# Patient Record
Sex: MALE | Race: WHITE | HISPANIC OR LATINO | Employment: UNEMPLOYED | URBAN - METROPOLITAN AREA
[De-identification: names, ages, dates, MRNs, and addresses within clinical notes are randomized per-mention and may not be internally consistent; named-entity substitution may affect disease eponyms.]

---
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
## 2019-02-15 ENCOUNTER — HOSPITAL ENCOUNTER (EMERGENCY)
Facility: CLINIC | Age: 19
Discharge: HOME | End: 2019-02-15
Attending: NURSE PRACTITIONER | Admitting: NURSE PRACTITIONER
Payer: COMMERCIAL

## 2019-02-15 VITALS
RESPIRATION RATE: 16 BRPM | SYSTOLIC BLOOD PRESSURE: 107 MMHG | WEIGHT: 160 LBS | HEART RATE: 82 BPM | DIASTOLIC BLOOD PRESSURE: 59 MMHG | TEMPERATURE: 98 F | BODY MASS INDEX: 22.96 KG/M2 | OXYGEN SATURATION: 98 %

## 2019-02-15 DIAGNOSIS — T50.901A DRUG OVERDOSE: ICD-10-CM

## 2019-02-15 PROCEDURE — 99282 EMERGENCY DEPT VISIT SF MDM: CPT

## 2019-02-15 RX ORDER — DIPHENHYDRAMINE HCL 25 MG
50 CAPSULE ORAL ONCE
Status: DISCONTINUED | OUTPATIENT
Start: 2019-02-15 | End: 2019-02-15

## 2019-02-15 ASSESSMENT — ENCOUNTER SYMPTOMS
FATIGUE: 1
VOMITING: 1
SHORTNESS OF BREATH: 1

## 2019-02-15 NOTE — ED AVS SNAPSHOT
[facility]  [address]  [facility] [address]  Phone:  [phone]  Fax:  [phone]                                    Juan Carlos Edouard   MRN: [id number]    Department:  [facility] Emergency Department   Date of Visit:  2/15/2019           After Visit Summary Signature Page    I have received my discharge instructions, and my questions have been answered. I have discussed any challenges I see with this plan with the nurse or doctor.    ..........................................................................................................................................  Patient/Patient Representative Signature      ..........................................................................................................................................  Patient Representative Print Name and Relationship to Patient    ..................................................               ................................................  Date                                   Time    ..........................................................................................................................................  Reviewed by Signature/Title    ...................................................              ..............................................  Date                                               Time          22EPIC Rev 08/18

## 2019-02-15 NOTE — ED PROVIDER NOTES
History     Chief Complaint:  Medication Reaction      HPI   Juan Carlos Edouard is a 18 year old male who presents with his mother for evaluation of medication reaction. The patient reports that he asked an acquaintance for pain medication for right hand pain he has had for the past year. His acquaintance gave him a blue pill which had the impression M30 on it. He took half of the pill around 11:30 AM and vomited 20 minutes later. He subsequently began to feel itchy and continued to vomited. Patient has vomited a total of 3 times with last emesis around noon. Patient mentions that he felt like he had some difficulty breathing for a short period of time and fatigue after he took the medication but this has resolved. Patient also states that he smoked Marijuana at 9 AM. He denies any thoughts of self harm or depression.  He has not taken any further OTC medications today including ibuprofen, tylenol or aspirin.    Allergies:  Penicillins     Medications:    The patient is not currently taking any prescribed medications.    Past Medical History:    The patient does not have any past pertinent medical history.    Past Surgical History:    Tonsillectomy  Myringotomy    Family History:    History reviewed. No pertinent family history.     Social History:  Smoking status: Current every day smoker  Alcohol use: No  Marital Status:  Single [1]       Review of Systems   Constitutional: Positive for fatigue.   Respiratory: Positive for shortness of breath.    Gastrointestinal: Positive for vomiting.   All other systems reviewed and are negative.      Physical Exam     Patient Vitals for the past 24 hrs:   BP Temp Temp src Pulse Heart Rate Resp SpO2 Weight   02/15/19 1455 -- -- -- 82 -- 20 98 % --   02/15/19 1411 134/59 98  F (36.7  C) Temporal 62 62 16 98 % 72.6 kg (160 lb)         Physical Exam  Constitutional: Alert, attentive, no evidence of sedation, GCS 15..    HENT: Mucous membranes are moist.   Eyes: EOM are normal.  PERRLA. Conjunctiva pink, no scleral icterus or conjunctival injection  CV: regular rate and rhythm; no murmurs, rubs or gallups.  Radial, dorsalis pedis and posterior tibial pulses 2+ bilaterally.  Cap refill <2 seconds.  Respiratory: Effort normal. Lungs clear to auscultation bilaterally. No crackles/rubs/wheezes.  Good air movement.    GI:  There is no tenderness; rebound or guarding. No distension. Normal bowel sounds.  MSK: Normal range of motion. No peripheral edema or calf tenderness.  Neurological: Alert, attentive.  Skin: Skin is warm and dry.  No rashes or petechiae.  Psychiatric: Normal affect.    Emergency Department Course     Emergency Department Course:  Past medical records, nursing notes, and vitals reviewed.  1419: I performed an exam of the patient and obtained history, as documented above.     1429: Talked to Yonatan from Poison Control.      1550: I rechecked the patient.  Findings and plan explained to the Patient. Patient discharged home with instructions regarding supportive care, medications, and reasons to return. The importance of close follow-up was reviewed.       Impression & Plan      Medical Decision Making:  Juan Carlos Edouard is a 18 year old male who presents for evaluation of pruritus after taking pain pill.  Her identification describes 30 mg morphine pill.  Patient had initial vomiting but has been able to tolerate p.o. since.  There is no hypoxia, evidence of sedation or respiratory distress.  Patient appears to be a good historian and denies any further ingestion.  Spoke with poison control who recommended monitoring patient until peak effect of the drug which would be approximately 3:30 PM.  He was monitored until approximately 4 PM.  At this time he was alert and reports resolution of pruritus.  I do not feel that further workup is indicated at today's visit.  I did discuss at length the dangers of taking unknown medications and the risk of overdose including death.  Encourage  patient to follow-up with primary provider to discuss his chronic pain.  Return precautions discussed including increasing drowsiness, difficulty breathing or any further concerns.    Critical Care time:  none    Diagnosis:    ICD-10-CM    1. Drug overdose T50.901A        Disposition:  discharged to home    Yossi Jade  2/15/2019   [facility] EMERGENCY DEPARTMENT    Scribe Disclosure:  I, Yossi Jade, am serving as a scribe at 2:19 PM on 2/15/2019 to document services personally performed by Claudette Mart APRN  based on my observations and the provider's statements to me.          Claudette Mart APRN CNP  02/15/19 1643

## 2019-06-22 ENCOUNTER — APPOINTMENT (OUTPATIENT)
Dept: CT IMAGING | Facility: CLINIC | Age: 19
End: 2019-06-22
Attending: EMERGENCY MEDICINE
Payer: COMMERCIAL

## 2019-06-22 ENCOUNTER — HOSPITAL ENCOUNTER (EMERGENCY)
Facility: CLINIC | Age: 19
Discharge: HOME | End: 2019-06-22
Attending: EMERGENCY MEDICINE | Admitting: EMERGENCY MEDICINE
Payer: COMMERCIAL

## 2019-06-22 VITALS
WEIGHT: 170 LBS | SYSTOLIC BLOOD PRESSURE: 131 MMHG | TEMPERATURE: 98.7 F | DIASTOLIC BLOOD PRESSURE: 52 MMHG | RESPIRATION RATE: 16 BRPM | HEART RATE: 89 BPM | OXYGEN SATURATION: 98 % | BODY MASS INDEX: 24.39 KG/M2
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
## 2019-06-22 DIAGNOSIS — S06.0X0A CONCUSSION WITHOUT LOSS OF CONSCIOUSNESS, INITIAL ENCOUNTER: ICD-10-CM

## 2019-06-22 DIAGNOSIS — T24.211A: ICD-10-CM

## 2019-06-22 DIAGNOSIS — S01.01XA LACERATION OF SCALP, INITIAL ENCOUNTER: ICD-10-CM

## 2019-06-22 LAB
ALBUMIN SERPL-MCNC: 4.4 G/DL (ref 3.4–5)
ALP SERPL-CCNC: 74 U/L (ref 40–150)
ALT SERPL W P-5'-P-CCNC: 23 U/L (ref 0–70)
ANION GAP SERPL CALCULATED.3IONS-SCNC: 7 MMOL/L (ref 3–14)
AST SERPL W P-5'-P-CCNC: 30 U/L (ref 0–45)
BASOPHILS # BLD AUTO: 0 10E9/L (ref 0–0.2)
BASOPHILS NFR BLD AUTO: 0.3 %
BILIRUB SERPL-MCNC: 1.2 MG/DL (ref 0.2–1.3)
BUN SERPL-MCNC: 14 MG/DL (ref 7–30)
CALCIUM SERPL-MCNC: 9 MG/DL (ref 8.5–10.1)
CHLORIDE SERPL-SCNC: 107 MMOL/L (ref 94–109)
CO2 SERPL-SCNC: 26 MMOL/L (ref 20–32)
CREAT SERPL-MCNC: 1.05 MG/DL (ref 0.66–1.25)
DIFFERENTIAL METHOD BLD: NORMAL
EOSINOPHIL # BLD AUTO: 0.1 10E9/L (ref 0–0.7)
EOSINOPHIL NFR BLD AUTO: 1.3 %
ERYTHROCYTE [DISTWIDTH] IN BLOOD BY AUTOMATED COUNT: 12.2 % (ref 10–15)
ETHANOL SERPL-MCNC: <0.01 G/DL
GFR SERPL CREATININE-BSD FRML MDRD: >90 ML/MIN/{1.73_M2}
GLUCOSE SERPL-MCNC: 87 MG/DL (ref 70–99)
HCT VFR BLD AUTO: 47.1 % (ref 40–53)
HGB BLD-MCNC: 16.6 G/DL (ref 13.3–17.7)
IMM GRANULOCYTES # BLD: 0 10E9/L (ref 0–0.4)
IMM GRANULOCYTES NFR BLD: 0.3 %
LYMPHOCYTES # BLD AUTO: 1.4 10E9/L (ref 0.8–5.3)
LYMPHOCYTES NFR BLD AUTO: 16 %
MCH RBC QN AUTO: 32.2 PG (ref 26.5–33)
MCHC RBC AUTO-ENTMCNC: 35.2 G/DL (ref 31.5–36.5)
MCV RBC AUTO: 91 FL (ref 78–100)
MONOCYTES # BLD AUTO: 0.6 10E9/L (ref 0–1.3)
MONOCYTES NFR BLD AUTO: 6.9 %
NEUTROPHILS # BLD AUTO: 6.6 10E9/L (ref 1.6–8.3)
NEUTROPHILS NFR BLD AUTO: 75.2 %
NRBC # BLD AUTO: 0 10*3/UL
NRBC BLD AUTO-RTO: 0 /100
PLATELET # BLD AUTO: 243 10E9/L (ref 150–450)
POTASSIUM SERPL-SCNC: 3.9 MMOL/L (ref 3.4–5.3)
PROT SERPL-MCNC: 7.9 G/DL (ref 6.8–8.8)
RBC # BLD AUTO: 5.16 10E12/L (ref 4.4–5.9)
SODIUM SERPL-SCNC: 140 MMOL/L (ref 133–144)
WBC # BLD AUTO: 8.8 10E9/L (ref 4–11)

## 2019-06-22 PROCEDURE — 25000128 H RX IP 250 OP 636: Performed by: EMERGENCY MEDICINE

## 2019-06-22 PROCEDURE — 72125 CT NECK SPINE W/O DYE: CPT

## 2019-06-22 PROCEDURE — 16020 DRESS/DEBRID P-THICK BURN S: CPT

## 2019-06-22 PROCEDURE — 12001 RPR S/N/AX/GEN/TRNK 2.5CM/<: CPT

## 2019-06-22 PROCEDURE — 85025 COMPLETE CBC W/AUTO DIFF WBC: CPT | Performed by: EMERGENCY MEDICINE

## 2019-06-22 PROCEDURE — 96374 THER/PROPH/DIAG INJ IV PUSH: CPT

## 2019-06-22 PROCEDURE — 70450 CT HEAD/BRAIN W/O DYE: CPT

## 2019-06-22 PROCEDURE — 80053 COMPREHEN METABOLIC PANEL: CPT | Performed by: EMERGENCY MEDICINE

## 2019-06-22 PROCEDURE — 80320 DRUG SCREEN QUANTALCOHOLS: CPT | Performed by: EMERGENCY MEDICINE

## 2019-06-22 PROCEDURE — 99285 EMERGENCY DEPT VISIT HI MDM: CPT | Mod: 25

## 2019-06-22 RX ORDER — IBUPROFEN 800 MG/1
800 TABLET, FILM COATED ORAL EVERY 8 HOURS PRN
Qty: 30 TABLET | Refills: 0 | Status: SHIPPED | OUTPATIENT
Start: 2019-06-22 | End: 2021-12-28

## 2019-06-22 RX ORDER — BACITRACIN ZINC 500 [USP'U]/G
OINTMENT TOPICAL 2 TIMES DAILY
Qty: 30 G | Refills: 1 | Status: SHIPPED | OUTPATIENT
Start: 2019-06-22 | End: 2021-12-28

## 2019-06-22 RX ORDER — GINSENG 100 MG
CAPSULE ORAL
Status: DISCONTINUED
Start: 2019-06-22 | End: 2019-06-22 | Stop reason: HOSPADM

## 2019-06-22 RX ORDER — HYDROMORPHONE HYDROCHLORIDE 1 MG/ML
0.5 INJECTION, SOLUTION INTRAMUSCULAR; INTRAVENOUS; SUBCUTANEOUS
Status: DISCONTINUED | OUTPATIENT
Start: 2019-06-22 | End: 2019-06-22 | Stop reason: HOSPADM

## 2019-06-22 RX ORDER — CYCLOBENZAPRINE HCL 10 MG
10 TABLET ORAL 3 TIMES DAILY PRN
Qty: 15 TABLET | Refills: 0 | Status: SHIPPED | OUTPATIENT
Start: 2019-06-22 | End: 2019-06-27

## 2019-06-22 RX ORDER — LIDOCAINE HYDROCHLORIDE AND EPINEPHRINE 10; 10 MG/ML; UG/ML
5 INJECTION, SOLUTION INFILTRATION; PERINEURAL ONCE
Status: DISCONTINUED | OUTPATIENT
Start: 2019-06-22 | End: 2019-06-22 | Stop reason: HOSPADM

## 2019-06-22 RX ADMIN — HYDROMORPHONE HYDROCHLORIDE 0.5 MG: 1 INJECTION, SOLUTION INTRAMUSCULAR; INTRAVENOUS; SUBCUTANEOUS at 13:34

## 2019-06-22 ASSESSMENT — ENCOUNTER SYMPTOMS
NECK PAIN: 0
BACK PAIN: 1

## 2019-06-22 NOTE — ED AVS SNAPSHOT
[facility] Emergency Department  [address]  [facility] [address]  Phone:  [phone]  Fax:  [phone]                                    Juan Carlos Edouard   MRN: [id number]    Department:  [facility] Emergency Department   Date of Visit:  6/22/2019           After Visit Summary Signature Page    I have received my discharge instructions, and my questions have been answered. I have discussed any challenges I see with this plan with the nurse or doctor.    ..........................................................................................................................................  Patient/Patient Representative Signature      ..........................................................................................................................................  Patient Representative Print Name and Relationship to Patient    ..................................................               ................................................  Date                                   Time    ..........................................................................................................................................  Reviewed by Signature/Title    ...................................................              ..............................................  Date                                               Time          22EPIC Rev 08/18
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
## 2021-12-28 ENCOUNTER — HOSPITAL ENCOUNTER (EMERGENCY)
Facility: CLINIC | Age: 21
Discharge: PSYCHIATRIC OR SUBSTANCE USE FACILITY | End: 2021-12-30
Attending: EMERGENCY MEDICINE | Admitting: EMERGENCY MEDICINE
Payer: MEDICAID

## 2021-12-28 ENCOUNTER — TELEPHONE (OUTPATIENT)
Dept: BEHAVIORAL HEALTH | Facility: CLINIC | Age: 21
End: 2021-12-28

## 2021-12-28 DIAGNOSIS — R45.850 HOMICIDAL IDEATION: ICD-10-CM

## 2021-12-28 DIAGNOSIS — F29 PSYCHOSIS, UNSPECIFIED PSYCHOSIS TYPE (H): ICD-10-CM

## 2021-12-28 DIAGNOSIS — F12.90 MARIJUANA USE: ICD-10-CM

## 2021-12-28 LAB
ALBUMIN SERPL-MCNC: 4.1 G/DL (ref 3.4–5)
ALP SERPL-CCNC: 66 U/L (ref 40–150)
ALT SERPL W P-5'-P-CCNC: 27 U/L (ref 0–70)
AMPHETAMINES UR QL SCN: ABNORMAL
ANION GAP SERPL CALCULATED.3IONS-SCNC: 8 MMOL/L (ref 3–14)
AST SERPL W P-5'-P-CCNC: 30 U/L (ref 0–45)
BARBITURATES UR QL: ABNORMAL
BASOPHILS # BLD AUTO: 0 10E3/UL (ref 0–0.2)
BASOPHILS NFR BLD AUTO: 0 %
BENZODIAZ UR QL: ABNORMAL
BILIRUB SERPL-MCNC: 1.2 MG/DL (ref 0.2–1.3)
BUN SERPL-MCNC: 11 MG/DL (ref 7–30)
CALCIUM SERPL-MCNC: 8.3 MG/DL (ref 8.5–10.1)
CANNABINOIDS UR QL SCN: ABNORMAL
CHLORIDE BLD-SCNC: 107 MMOL/L (ref 94–109)
CO2 SERPL-SCNC: 25 MMOL/L (ref 20–32)
COCAINE UR QL: ABNORMAL
CREAT SERPL-MCNC: 1.19 MG/DL (ref 0.66–1.25)
EOSINOPHIL # BLD AUTO: 0 10E3/UL (ref 0–0.7)
EOSINOPHIL NFR BLD AUTO: 0 %
ERYTHROCYTE [DISTWIDTH] IN BLOOD BY AUTOMATED COUNT: 12.1 % (ref 10–15)
ETHANOL SERPL-MCNC: <0.01 G/DL
GFR SERPL CREATININE-BSD FRML MDRD: 88 ML/MIN/1.73M2
GLUCOSE BLD-MCNC: 84 MG/DL (ref 70–99)
HCT VFR BLD AUTO: 45.6 % (ref 40–53)
HGB BLD-MCNC: 14.7 G/DL (ref 13.3–17.7)
IMM GRANULOCYTES # BLD: 0.1 10E3/UL
IMM GRANULOCYTES NFR BLD: 1 %
LYMPHOCYTES # BLD AUTO: 1.6 10E3/UL (ref 0.8–5.3)
LYMPHOCYTES NFR BLD AUTO: 11 %
MCH RBC QN AUTO: 30.4 PG (ref 26.5–33)
MCHC RBC AUTO-ENTMCNC: 32.2 G/DL (ref 31.5–36.5)
MCV RBC AUTO: 94 FL (ref 78–100)
MONOCYTES # BLD AUTO: 1.1 10E3/UL (ref 0–1.3)
MONOCYTES NFR BLD AUTO: 7 %
NEUTROPHILS # BLD AUTO: 12.6 10E3/UL (ref 1.6–8.3)
NEUTROPHILS NFR BLD AUTO: 81 %
NRBC # BLD AUTO: 0 10E3/UL
NRBC BLD AUTO-RTO: 0 /100
OPIATES UR QL SCN: ABNORMAL
PLATELET # BLD AUTO: 268 10E3/UL (ref 150–450)
POTASSIUM BLD-SCNC: 3.3 MMOL/L (ref 3.4–5.3)
PROT SERPL-MCNC: 7.3 G/DL (ref 6.8–8.8)
RBC # BLD AUTO: 4.84 10E6/UL (ref 4.4–5.9)
SODIUM SERPL-SCNC: 140 MMOL/L (ref 133–144)
WBC # BLD AUTO: 15.5 10E3/UL (ref 4–11)

## 2021-12-28 PROCEDURE — 99285 EMERGENCY DEPT VISIT HI MDM: CPT | Mod: 25

## 2021-12-28 PROCEDURE — 36415 COLL VENOUS BLD VENIPUNCTURE: CPT | Performed by: EMERGENCY MEDICINE

## 2021-12-28 PROCEDURE — 80053 COMPREHEN METABOLIC PANEL: CPT | Performed by: EMERGENCY MEDICINE

## 2021-12-28 PROCEDURE — 85025 COMPLETE CBC W/AUTO DIFF WBC: CPT | Performed by: EMERGENCY MEDICINE

## 2021-12-28 PROCEDURE — 90791 PSYCH DIAGNOSTIC EVALUATION: CPT

## 2021-12-28 PROCEDURE — 82077 ASSAY SPEC XCP UR&BREATH IA: CPT | Performed by: EMERGENCY MEDICINE

## 2021-12-28 PROCEDURE — 250N000013 HC RX MED GY IP 250 OP 250 PS 637: Performed by: EMERGENCY MEDICINE

## 2021-12-28 PROCEDURE — 250N000011 HC RX IP 250 OP 636: Performed by: EMERGENCY MEDICINE

## 2021-12-28 PROCEDURE — 80307 DRUG TEST PRSMV CHEM ANLYZR: CPT | Performed by: EMERGENCY MEDICINE

## 2021-12-28 PROCEDURE — 96372 THER/PROPH/DIAG INJ SC/IM: CPT | Performed by: EMERGENCY MEDICINE

## 2021-12-28 RX ORDER — LANOLIN ALCOHOL/MO/W.PET/CERES
3 CREAM (GRAM) TOPICAL
Status: DISCONTINUED | OUTPATIENT
Start: 2021-12-28 | End: 2021-12-30 | Stop reason: HOSPADM

## 2021-12-28 RX ORDER — OLANZAPINE 5 MG/1
10 TABLET, ORALLY DISINTEGRATING ORAL 2 TIMES DAILY PRN
Status: DISCONTINUED | OUTPATIENT
Start: 2021-12-28 | End: 2021-12-30 | Stop reason: HOSPADM

## 2021-12-28 RX ORDER — HYDROXYZINE HYDROCHLORIDE 25 MG/1
25 TABLET, FILM COATED ORAL EVERY 4 HOURS PRN
Status: DISCONTINUED | OUTPATIENT
Start: 2021-12-28 | End: 2021-12-30 | Stop reason: HOSPADM

## 2021-12-28 RX ORDER — OLANZAPINE 10 MG/2ML
10 INJECTION, POWDER, FOR SOLUTION INTRAMUSCULAR ONCE
Status: COMPLETED | OUTPATIENT
Start: 2021-12-28 | End: 2021-12-28

## 2021-12-28 RX ORDER — HYDROXYZINE HYDROCHLORIDE 25 MG/1
25 TABLET, FILM COATED ORAL
Status: DISCONTINUED | OUTPATIENT
Start: 2021-12-28 | End: 2021-12-30 | Stop reason: HOSPADM

## 2021-12-28 RX ADMIN — OLANZAPINE 10 MG: 10 INJECTION, POWDER, LYOPHILIZED, FOR SOLUTION INTRAMUSCULAR at 03:31

## 2021-12-28 RX ADMIN — HYDROXYZINE HYDROCHLORIDE 25 MG: 25 TABLET, FILM COATED ORAL at 22:47

## 2021-12-28 RX ADMIN — Medication 3 MG: at 23:20
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
## 2021-12-28 NOTE — ED NOTES
"Went into room to give patient his IM injection. Pt initially was happy to see me until I told him I had an injection of medication to give him. Pt started to struggle against the restraints and started screaming \"please don't kill me...Im not with the mafia, ask [place] police, they can tell you\" also \"please do a drug screen before you give me that medication, Im not on drugs\".  Pt continued to talk about the mafia, screamed when I gave the shot, and then slumped over and said \"Im going to die now right?\" pt assured we were not trying to kill him and the shot is only intended to help calm him down not kill him. Pt then offered water and given a blanket. Calmed down and was resting, comfortable appearing 30 minutes after injection.  "
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
## 2021-12-28 NOTE — ED NOTES
12/28/2021  Juan Carlos Edouard 2000     [facility] Crisis Assessment    Patient was assessed: remote  Patient location: [facility] ED    [facility] attempted to meet with pt this morning at 5:42AM. Pt was sleeping at time of assessment and woke with verbal prompts. Pt states he is tired and would like to sleep. Pt tells [facility] he wants a  and made a comment about prison that was inaudible and would not repeat when asked. Pt kept trying to lay down and then would cover his head with a blanket. Pt refused to answer any questions asked. Interview ended at 5:57AM.    Dr Dunn reports pt talked to him about how the provider needed to call the FBI because someone planted something at the pt's place. Pt told Dr Dunn no one believed him. Provider was unsure about the welfare check and how that transpired. Dr Dunn reports pt took off from his residence when police arrived without a shirt or shoes, was apprehended and brought to ED by EMS in restraints and then placed in restraints in the ED and given 10mgs Zyprexa.    Clinical on call consulted with [facility] regarding this case. [facility] discussed with Dr Dunn the result of that conversation and that this was not enough information to justify IP MH placement at this time. Pt is encouraged to rest until he is able to participate on his own in a DEC assessment. Pt's presentation may improve with rest and will allow Zyprexa to further metabolize.  It was also discussed that a resulted drug screen is needed before another request is made for another DEC assessment. Denise Nelson, [facility], [facility]

## 2021-12-28 NOTE — PHARMACY-ADMISSION MEDICATION HISTORY
Admission medication history interview status for this patient is complete. See [facility] admission navigator for allergy information, prior to admission medications and immunization status.     Medication history interview done, indicate source(s): Patient  Medication history resources (including written lists, pill bottles, clinic record):None    Changes made to PTA medication list:  Added: none  Changed: none  Reported as Not Taking: none  Removed: bacitracin, ibuprofen    Actions taken by pharmacist (provider contacted, etc):None     Additional medication history information: Patient stated multiple times that he does not take drugs, only smokes marijuana.    Medication reconciliation/reorder completed by provider prior to medication history?  N   (Y/N)     Prior to Admission medications    Not on File     Celia Harirs, PharmD, [facility]  Emergency Medicine Clinical Pharmacist  [phone]

## 2021-12-28 NOTE — ED NOTES
[facility] ED Behavioral Health Handoff Note:       Brief HPI:  This is a 21 year old male signed out to me by Dr. Dunn .  See initial ED Provider note for details of the presentation.  Attempted DECassessment but was unsuccessful.  Awaiting urine drug screen and repeat DEC assessment.    Patient is medically cleared for admission to a Behavioral Health unit.      Pending studies:Urine drug screen.      Hold Status:  Active Orders   Legal    Health Officer Authority to Detain (KITTY)     Frequency: Effective Now     Start Date/Time: 12/28/21 0312      Number of Occurrences: Until Specified     Order Comments: This patient presented with circumstances that have led me to be reasonably suspicious that the patient is experiencing psychosis. The patient's judgment to this situation appears to be impaired. Given the circumstances in which the patient presented, it is likely that the patient is at significant risk of harming self or others if this situation is not investigated further. I am highly concerned that the patient is mentally ill and currently cannot safely care for oneself. This represents endangerment to the patient's well-being and safety, and I am placing a Health Officer Authority hold upon the patient at this time.      Legal status 72 Hour Hold     Frequency: Effective Now     Start Date/Time: 12/28/21 1418      Number of Occurrences: Until Specified           The patient has required medication for agitation.    The patient's home medications have not been reviewed and ordered/administered. Order placed at 1149.     Exam:   Temp:  [98.3  F (36.8  C)] 98.3  F (36.8  C)  Pulse:  [] 94  Resp:  [15-20] 20  BP: (110-125)/() 113/54  SpO2:  [95 %-98 %] 95 %    Nursing note and vitals reviewed.    Constitutional: Awake and seated on the edge of the bed.         HENT:    Eyes: Conjunctivae normal are normal. No scleral icterus.   Pulmonary/Chest: Effort normal    Neurological:Alert.  Coordination normal.   Skin: Skin is warm and dry.   Psychiatric: Mildly pressured speech.      ED Course:    Medications   OLANZapine (zyPREXA) injection 10 mg (10 mg Intramuscular Given 12/28/21 0331)       11:50  Patient on list again for DEC assessment.   12:57 Consulted with DEC . Admitted to marijuana use yesterday and cocaine on 12/19. Refer to report for complete assessment.  Unable to contact his mother as phone number is incorrect.  1415: Call received from [facility] reporting that the patient's mother has had contact with them starting yesterday with reports that he has been threatening to kill her.  Spoke with DEC again. Placed on 72 hour hold.  Was provided with mother's phone number to call for collateral information.    I spoke with DEC again who has received collateral information from the patient's mother and is now recommending inpatient psychiatric evaluation and management.    There were no significant events while under my care.      Patient was signed out to the oncoming provider. Dr. Bender      Impression:    ICD-10-CM    1. Psychosis, unspecified psychosis type (H)  F29 Primary Care Referral   2. Marijuana use  F12.90    3. Homicidal ideation  R45.850        Plan:    1. Await Transfer to Mental Health Facility      RESULTS:   Results for orders placed or performed during the hospital encounter of 12/28/21 (from the past 24 hour(s))   CBC + differential     Status: Abnormal    Collection Time: 12/28/21  4:10 AM    Narrative    The following orders were created for panel order CBC + differential.  Procedure                               Abnormality         Status                     ---------                               -----------         ------                     CBC with platelets and d...[[id number]]  Abnormal            Final result                 Please view results for these tests on the individual orders.   Comprehensive metabolic panel     Status: Abnormal     Collection Time: 12/28/21  4:10 AM   Result Value Ref Range    Sodium 140 133 - 144 mmol/L    Potassium 3.3 (L) 3.4 - 5.3 mmol/L    Chloride 107 94 - 109 mmol/L    Carbon Dioxide (CO2) 25 20 - 32 mmol/L    Anion Gap 8 3 - 14 mmol/L    Urea Nitrogen 11 7 - 30 mg/dL    Creatinine 1.19 0.66 - 1.25 mg/dL    Calcium 8.3 (L) 8.5 - 10.1 mg/dL    Glucose 84 70 - 99 mg/dL    Alkaline Phosphatase 66 40 - 150 U/L    AST 30 0 - 45 U/L    ALT 27 0 - 70 U/L    Protein Total 7.3 6.8 - 8.8 g/dL    Albumin 4.1 3.4 - 5.0 g/dL    Bilirubin Total 1.2 0.2 - 1.3 mg/dL    GFR Estimate 88 >60 mL/min/1.73m2   Alcohol level blood     Status: Normal    Collection Time: 12/28/21  4:10 AM   Result Value Ref Range    Alcohol ethyl <0.01 <=0.01 g/dL   CBC with platelets and differential     Status: Abnormal    Collection Time: 12/28/21  4:10 AM   Result Value Ref Range    WBC Count 15.5 (H) 4.0 - 11.0 10e3/uL    RBC Count 4.84 4.40 - 5.90 10e6/uL    Hemoglobin 14.7 13.3 - 17.7 g/dL    Hematocrit 45.6 40.0 - 53.0 %    MCV 94 78 - 100 fL    MCH 30.4 26.5 - 33.0 pg    MCHC 32.2 31.5 - 36.5 g/dL    RDW 12.1 10.0 - 15.0 %    Platelet Count 268 150 - 450 10e3/uL    % Neutrophils 81 %    % Lymphocytes 11 %    % Monocytes 7 %    % Eosinophils 0 %    % Basophils 0 %    % Immature Granulocytes 1 %    NRBCs per 100 WBC 0 <1 /100    Absolute Neutrophils 12.6 (H) 1.6 - 8.3 10e3/uL    Absolute Lymphocytes 1.6 0.8 - 5.3 10e3/uL    Absolute Monocytes 1.1 0.0 - 1.3 10e3/uL    Absolute Eosinophils 0.0 0.0 - 0.7 10e3/uL    Absolute Basophils 0.0 0.0 - 0.2 10e3/uL    Absolute Immature Granulocytes 0.1 <=0.4 10e3/uL    Absolute NRBCs 0.0 10e3/uL   Urine Drugs of Abuse Screen     Status: None ()    Collection Time: 12/28/21 11:35 AM    Narrative    The following orders were created for panel order Urine Drugs of Abuse Screen.  Procedure                               Abnormality         Status                     ---------                               -----------          ------                     Drug abuse screen 1 urin...[[id number]]                                                   Please view results for these tests on the individual orders.             MD Verenice Soliz, Paloma Ferguson MD  12/28/21 [id number]

## 2021-12-28 NOTE — ED NOTES
[facility] ED Behavioral Health Handoff Note:       Brief HPI:  This is a 21 year old male signed out to me by Dr. Caldera.  See initial ED Provider note for details of the presentation.     Patient is medically cleared for admission to a Behavioral Health unit.      Hold Status:  Active Orders   Legal    Health Officer Authority to Detain (KITTY)     Frequency: Effective Now     Start Date/Time: 12/28/21 0312      Number of Occurrences: Until Specified     Order Comments: This patient presented with circumstances that have led me to be reasonably suspicious that the patient is experiencing psychosis. The patient's judgment to this situation appears to be impaired. Given the circumstances in which the patient presented, it is likely that the patient is at significant risk of harming self or others if this situation is not investigated further. I am highly concerned that the patient is mentally ill and currently cannot safely care for oneself. This represents endangerment to the patient's well-being and safety, and I am placing a Health Officer Authority hold upon the patient at this time.      Legal status 72 Hour Hold     Frequency: Effective Now     Start Date/Time: 12/28/21 1418      Number of Occurrences: Until Specified       The patient has not required medication for agitation.    The patient's home medications have been reviewed and ordered/administered.    Exam:   Temp:  [98.3  F (36.8  C)] 98.3  F (36.8  C)  Pulse:  [] 94  Resp:  [15-20] 20  BP: (110-125)/() 113/54  SpO2:  [95 %-98 %] 95 %    Patient reassessed. Appears calm and comfortable.    ED Course:    Medications   OLANZapine zydis (zyPREXA) ODT tab 10 mg (has no administration in time range)   hydrOXYzine (ATARAX) tablet 25 mg (has no administration in time range)   melatonin tablet 3 mg (has no administration in time range)   hydrOXYzine (ATARAX) tablet 25 mg (has no administration in time range)   OLANZapine (zyPREXA) injection 10  mg (10 mg Intramuscular Given 12/28/21 0331)       There were no significant events while under my care.      Patient was signed out to the oncoming provider. Dr. Dunn      Impression:    ICD-10-CM    1. Psychosis, unspecified psychosis type (H)  F29 Primary Care Referral   2. Marijuana use  F12.90    3. Homicidal ideation  R45.850        Plan:    1. Await Transfer to Mental Health Facility      RESULTS:   Results for orders placed or performed during the hospital encounter of 12/28/21 (from the past 24 hour(s))   CBC + differential     Status: Abnormal    Collection Time: 12/28/21  4:10 AM    Narrative    The following orders were created for panel order CBC + differential.  Procedure                               Abnormality         Status                     ---------                               -----------         ------                     CBC with platelets and d...[[id number]]  Abnormal            Final result                 Please view results for these tests on the individual orders.   Comprehensive metabolic panel     Status: Abnormal    Collection Time: 12/28/21  4:10 AM   Result Value Ref Range    Sodium 140 133 - 144 mmol/L    Potassium 3.3 (L) 3.4 - 5.3 mmol/L    Chloride 107 94 - 109 mmol/L    Carbon Dioxide (CO2) 25 20 - 32 mmol/L    Anion Gap 8 3 - 14 mmol/L    Urea Nitrogen 11 7 - 30 mg/dL    Creatinine 1.19 0.66 - 1.25 mg/dL    Calcium 8.3 (L) 8.5 - 10.1 mg/dL    Glucose 84 70 - 99 mg/dL    Alkaline Phosphatase 66 40 - 150 U/L    AST 30 0 - 45 U/L    ALT 27 0 - 70 U/L    Protein Total 7.3 6.8 - 8.8 g/dL    Albumin 4.1 3.4 - 5.0 g/dL    Bilirubin Total 1.2 0.2 - 1.3 mg/dL    GFR Estimate 88 >60 mL/min/1.73m2   Alcohol level blood     Status: Normal    Collection Time: 12/28/21  4:10 AM   Result Value Ref Range    Alcohol ethyl <0.01 <=0.01 g/dL   CBC with platelets and differential     Status: Abnormal    Collection Time: 12/28/21  4:10 AM   Result Value Ref Range    WBC Count 15.5 (H) 4.0 -  11.0 10e3/uL    RBC Count 4.84 4.40 - 5.90 10e6/uL    Hemoglobin 14.7 13.3 - 17.7 g/dL    Hematocrit 45.6 40.0 - 53.0 %    MCV 94 78 - 100 fL    MCH 30.4 26.5 - 33.0 pg    MCHC 32.2 31.5 - 36.5 g/dL    RDW 12.1 10.0 - 15.0 %    Platelet Count 268 150 - 450 10e3/uL    % Neutrophils 81 %    % Lymphocytes 11 %    % Monocytes 7 %    % Eosinophils 0 %    % Basophils 0 %    % Immature Granulocytes 1 %    NRBCs per 100 WBC 0 <1 /100    Absolute Neutrophils 12.6 (H) 1.6 - 8.3 10e3/uL    Absolute Lymphocytes 1.6 0.8 - 5.3 10e3/uL    Absolute Monocytes 1.1 0.0 - 1.3 10e3/uL    Absolute Eosinophils 0.0 0.0 - 0.7 10e3/uL    Absolute Basophils 0.0 0.0 - 0.2 10e3/uL    Absolute Immature Granulocytes 0.1 <=0.4 10e3/uL    Absolute NRBCs 0.0 10e3/uL   Urine Drugs of Abuse Screen     Status: Abnormal    Collection Time: 12/28/21 11:35 AM    Narrative    The following orders were created for panel order Urine Drugs of Abuse Screen.  Procedure                               Abnormality         Status                     ---------                               -----------         ------                     Drug abuse screen 1 urin...[[id number]]  Abnormal            Final result                 Please view results for these tests on the individual orders.   Drug abuse screen 1 urine (ED)     Status: Abnormal    Collection Time: 12/28/21 11:35 AM   Result Value Ref Range    Amphetamines Urine Screen Negative Screen Negative    Barbiturates Urine Screen Negative Screen Negative    Benzodiazepines Urine Screen Negative Screen Negative    Cannabinoids Urine Screen Positive (A) Screen Negative    Cocaine Urine Screen Negative Screen Negative    Opiates Urine Screen Negative Screen Negative             MD Napoleon Muir Tracy Dianne, MD  12/28/21 4047

## 2021-12-28 NOTE — DISCHARGE INSTRUCTIONS
Aftercare Plan  If I am feeling unsafe or I am in a crisis, I will:   Contact my established care providers   Call the National Suicide Prevention Lifeline: 135.495.5669   Go to the nearest emergency room   Call 910     Warning signs that I or other people might notice when a crisis is developing for me:   Thoughts of hurting myself or others  Feeling so anxious that I am not able to sleep, eat, work or take care of myself    Things I am able to do on my own to cope or help me feel better:   Get good sleep  Work out at the gym  Watch youtube, documentaries or play games on my phone     Things that I am able to do with others to cope or help me better:   Try to think of anyone in your life that might be calming to talk to or be around     Things I can use or do for distraction:   Games, movies.    Changes I can make to support my mental health and wellness:   Avoid use of ALL drugs and alcohol  Try to eat healthy, get good sleep and exercise  Consider talking to a mental health provider    People in my life that I can ask for help:   My mom     Your [facility] has a mental health crisis team you can call 24/7: [facility]  [phone]   Urgent help (24/7)  The Crisis Response Unit is available to all people in the community. Phones are answered by caring staff who know a lot about mental health, housing, shelters, food, employment and transportation.  The Crisis Response Unit is considered a voluntary service. This means you choose if you want help from us.    Other things that are important when I m in crisis:   Remember you are not alone, reach out to others for help.      Additional resources and information:      [facility] - this clinic provides services regardless of your ability to pay. Please consider meeting with a provider here for both a medical and mental health check-up.  [phone]  [email or web address]  [address], [place] [address]    Crisis  "Lines  Crisis Text Line  Text 504499  You will be connected with a trained live crisis counselor to provide support.    National Hope Line  1.800.SUICIDE [6107605]      Community Resources  Fast Tracker  Linking people to mental health and substance use disorder resources  [employer]Urthe[employer]     [place] Mental Health Warm Line  Peer to peer support  Monday thru Saturday, 12 pm to 10 pm  366.686.4732 or 2.472.642.2926  Text \"Support\" to 43112    National Cope on Mental Illness (YUDY)  110.018.2536 or 1.888.YUDY.HELPS        Mental Health Apps  My3  https://myWayout Entertainmentpp.org/    VirtualHopeBox  https://[email or web address]/apps/virtual-hope-box/    "

## 2021-12-28 NOTE — ED PROVIDER NOTES
"  History   Chief Complaint:  Mental Health Problem     The history is provided by the patient and the EMS personnel.      Juan Carlos Edouard is a 21 year old male who presents via EMS with mental health problem. Per police hold paperwork, police were called to make a wellness check. On their arrival, pt apparently ran away without a shirt or shoes on and arrived to a local gas station where he displayed paranoia about people chasing him. EMS ultimately arrived and pt was placed in restraints and he was brought to ED. Here, pt immediately asks me if I'm an FBI agent and asks me to \"get the FBI here, now!\" He also asks me to call any police department except [place] out of concern that they're currently at his house framing him and planting evidence. Pt denies any drug use to me. Nursing note reports marijuana use.     Review of Systems   Unable to perform ROS: Mental status change       Allergies:  Penicillins    Medications:  The patient is not currently taking any daily medications.     Past Medical History:     The patient denies past medical history.       Past Surgical History:    Bilateral myringotomy  Tonsillectomy      Family History:    The patient denies past family history.     Social History:  Presents to ED alone.     Physical Exam     Patient Vitals for the past 24 hrs:   BP Temp Temp src Pulse Resp SpO2   12/28/21 0230 119/66 -- -- 106 -- --   12/28/21 0200 (!) 125/108 -- -- (!) 125 -- --   12/28/21 0159 (!) 125/108 98.3  F (36.8  C) Oral (!) 122 18 98 %       Physical Exam  Nursing note and vitals reviewed.  Constitutional: Pt in 5 point restraints, quite animated but not aggressively fighting them.   HENT:   Mouth/Throat: Moist mucous membranes.   Eyes: EOMI, nonicteric sclera  Cardiovascular: tachycardic, regular rhythm, no murmurs, rubs, or gallops  Pulmonary/Chest: Effort normal and breath sounds normal. No respiratory distress. No wheezes. No rales.   Abdominal: Soft. Nontender, nondistended, no " guarding or rigidity.   Musculoskeletal: Normal range of motion.   Neurological: Alert. Moves all extremities spontaneously.   Skin: Skin is warm and dry. No rash noted.   Psychiatric: Speech rapid and fluent. Nonbizarre paranoid delusions prominent. Doesn't appear to be responding to internal stimuli.       Emergency Department Course     Laboratory:  Labs Ordered and Resulted from Time of ED Arrival to Time of ED Departure   COMPREHENSIVE METABOLIC PANEL - Abnormal       Result Value    Sodium 140      Potassium 3.3 (*)     Chloride 107      Carbon Dioxide (CO2) 25      Anion Gap 8      Urea Nitrogen 11      Creatinine 1.19      Calcium 8.3 (*)     Glucose 84      Alkaline Phosphatase 66      AST 30      ALT 27      Protein Total 7.3      Albumin 4.1      Bilirubin Total 1.2      GFR Estimate 88     CBC WITH PLATELETS AND DIFFERENTIAL - Abnormal    WBC Count 15.5 (*)     RBC Count 4.84      Hemoglobin 14.7      Hematocrit 45.6      MCV 94      MCH 30.4      MCHC 32.2      RDW 12.1      Platelet Count 268      % Neutrophils 81      % Lymphocytes 11      % Monocytes 7      % Eosinophils 0      % Basophils 0      % Immature Granulocytes 1      NRBCs per 100 WBC 0      Absolute Neutrophils 12.6 (*)     Absolute Lymphocytes 1.6      Absolute Monocytes 1.1      Absolute Eosinophils 0.0      Absolute Basophils 0.0      Absolute Immature Granulocytes 0.1      Absolute NRBCs 0.0     ETHYL ALCOHOL LEVEL - Normal    Alcohol ethyl <0.01     DRUG ABUSE SCREEN 1 URINE (ED)      Emergency Department Course:    Restraint placement on arrival. Pt assessed @0310. Restraints placed at 0139.   In person face to face assessment completed, including an evaluation of the patient's immediate reaction to the intervention, complete review of systems assessment, behavioral assessment and review/assessment of history, drugs and medications, recent labs, etc., and behavioral condition.  The patient experienced: No adverse physical outcome  from seclusion/restraint initiation.  The intervention of restraint or seclusion needs to continue.  _______________________  Restraints able to be discontinued soon after zyprexa given before 4 hour kamille.     Reviewed:  I reviewed nursing notes, vitals, past medical history, Care Everywhere    Assessments:  I obtained history and examined the patient as noted above.   I rechecked the patient and explained findings.     Consults:  I spoke with DEC regarding the patient's presentation.     Interventions:  0331 Zyprexa 10 mg, IM    Disposition:  Care of the patient was transferred to my colleague Dr. Caldera pending bed placement.     Impression & Plan     Medical Decision Making:  Pt presents with erratic behavior and displays nonbizarre delusions. No prior history noted. Ddx includes drug-induced psychosis vs new onset schizophrenia vs psychosis NOS, vs schizoaffective vs other. Pt initially required restraints, but was able to come out after dosing with IM zyprexa. Paranoid behavior continued. He was evaluated by DEC, but he was uncooperative with assessment. Plan is to allow more time before repeat assessment. They are also requiring resulted UDS prior to reassessment. I explained to them that this may not be possible if declines to give one. Pt signed out to my ED partner, Dr. Caldera, pending this.     Diagnosis:    ICD-10-CM    1. Psychosis, unspecified psychosis type (H)  F29      Scribe Disclosure:  I, Diamond Bravo, am serving as a scribe at 2:17 AM on 12/28/2021 to document services personally performed by Cordell Dunn MD based on my observations and the provider's statements to me.      Cordell Dunn MD  12/28/21 [id number]

## 2021-12-28 NOTE — PROGRESS NOTES
Juan Carlos Edouard  December 28, 2021  SAFE Note    Critical Safety Issues:       Current Suicidal Ideation/Self-Injurious Concerns/Methods: None - N/A      Current or Historical Inappropriate Sexual Behavior: No      Current or Historical Aggression/Homicidal Ideation: Agitation/Hyperactivity and Specific Victim. Pt denies HI/plan/intent stating he would never harm anyone. Mom is reporting multiple threats to kill her, pt's brother and father within the last several days.       Triggers: Unknown; family is reporting recent and sudden change in pt's behavior, no hx violence.     Updated care team: Yes    For additional details see full LMHP assessment.       Carlene Huitron, DUKESW

## 2021-12-28 NOTE — ED NOTES
Attempted to obtain pt's mother's phone number as the one listed on chart is not correct. Pt unable to recall the number and phone was not brought in with pt. Unable to obtain phone number at this time.

## 2021-12-28 NOTE — ED NOTES
Taking over care of patient at this time as patient is transferred to the green pod. Patient in 5 point restraints upon assessment of this RN. Report received from FRANCOIS Tavera.

## 2021-12-29 ENCOUNTER — TELEPHONE (OUTPATIENT)
Dept: BEHAVIORAL HEALTH | Facility: CLINIC | Age: 21
End: 2021-12-29

## 2021-12-29 LAB — SARS-COV-2 RNA RESP QL NAA+PROBE: NEGATIVE

## 2021-12-29 PROCEDURE — 87635 SARS-COV-2 COVID-19 AMP PRB: CPT | Performed by: EMERGENCY MEDICINE

## 2021-12-29 PROCEDURE — 250N000013 HC RX MED GY IP 250 OP 250 PS 637: Performed by: EMERGENCY MEDICINE

## 2021-12-29 PROCEDURE — 96372 THER/PROPH/DIAG INJ SC/IM: CPT | Performed by: EMERGENCY MEDICINE

## 2021-12-29 PROCEDURE — 250N000011 HC RX IP 250 OP 636: Performed by: EMERGENCY MEDICINE

## 2021-12-29 RX ORDER — OLANZAPINE 5 MG/1
5-10 TABLET ORAL 3 TIMES DAILY PRN
Status: CANCELLED | OUTPATIENT
Start: 2021-12-29

## 2021-12-29 RX ORDER — HYDROXYZINE HYDROCHLORIDE 25 MG/1
25-50 TABLET, FILM COATED ORAL EVERY 4 HOURS PRN
Status: CANCELLED | OUTPATIENT
Start: 2021-12-29

## 2021-12-29 RX ORDER — AMOXICILLIN 250 MG
1 CAPSULE ORAL 2 TIMES DAILY PRN
Status: CANCELLED | OUTPATIENT
Start: 2021-12-29

## 2021-12-29 RX ORDER — MAGNESIUM HYDROXIDE/ALUMINUM HYDROXICE/SIMETHICONE 120; 1200; 1200 MG/30ML; MG/30ML; MG/30ML
30 SUSPENSION ORAL EVERY 4 HOURS PRN
Status: CANCELLED | OUTPATIENT
Start: 2021-12-29

## 2021-12-29 RX ORDER — ACETAMINOPHEN 325 MG/1
650 TABLET ORAL EVERY 4 HOURS PRN
Status: CANCELLED | OUTPATIENT
Start: 2021-12-29

## 2021-12-29 RX ORDER — OLANZAPINE 10 MG/2ML
10 INJECTION, POWDER, FOR SOLUTION INTRAMUSCULAR 3 TIMES DAILY PRN
Status: CANCELLED | OUTPATIENT
Start: 2021-12-29

## 2021-12-29 RX ORDER — LANOLIN ALCOHOL/MO/W.PET/CERES
3 CREAM (GRAM) TOPICAL
Status: CANCELLED | OUTPATIENT
Start: 2021-12-29

## 2021-12-29 RX ORDER — OLANZAPINE 10 MG/2ML
10 INJECTION, POWDER, FOR SOLUTION INTRAMUSCULAR ONCE
Status: COMPLETED | OUTPATIENT
Start: 2021-12-29 | End: 2021-12-29

## 2021-12-29 RX ORDER — OLANZAPINE 5 MG/1
10 TABLET, ORALLY DISINTEGRATING ORAL ONCE
Status: DISCONTINUED | OUTPATIENT
Start: 2021-12-29 | End: 2021-12-29

## 2021-12-29 RX ADMIN — HYDROXYZINE HYDROCHLORIDE 25 MG: 25 TABLET, FILM COATED ORAL at 14:47

## 2021-12-29 RX ADMIN — OLANZAPINE 10 MG: 10 INJECTION, POWDER, FOR SOLUTION INTRAMUSCULAR at 04:04

## 2021-12-29 RX ADMIN — OLANZAPINE 10 MG: 5 TABLET, ORALLY DISINTEGRATING ORAL at 14:18

## 2021-12-29 RX ADMIN — OLANZAPINE 10 MG: 5 TABLET, ORALLY DISINTEGRATING ORAL at 03:31

## 2021-12-29 NOTE — ED NOTES
Pt woke up and was given his previously ordered dinner, water, socks, and warm blankets. Pt thankful and cooperative.

## 2021-12-29 NOTE — ED NOTES
Pt sitting up in bed again breathing heavily and appearing anxious. Pt given PRN hydroxyzine and helped to be more comfortable in bed.

## 2021-12-29 NOTE — ED NOTES
"Patient stating \" my heart is pounding, it feels like I'm going to have a heart attack\". VSS stable. Pt declined cardiac monitor and stated \" I think a nap will make me feel better\".   "

## 2021-12-29 NOTE — ED PROVIDER NOTES
21 year old male received in signout from Dr. Dunn who presents to the ED w/ erractic behavior and delusions with concern for v.  Please see primary note for full details.  No acute events after signout.  Bed search in process.  Patient signed out in stable condition.               Jeremie Huang MD  12/29/21 1507

## 2021-12-29 NOTE — ED NOTES
Pt woke up suddenly hyperventilating with feelings of panic and anxiety. Pt reassured he is in a safe place, pt very appreciative of this. Pt cooperative and was able to calm himself after a few minutes of talking. Provided pt with another blanket and more water. Will continue to monitor.

## 2021-12-29 NOTE — TELEPHONE ENCOUNTER
R:  [facility] system at capacity at this time. Covid test not ordered nor collected. Called ED @ 0612 to request Covid test be ordered/collected.     Pt remains on work list until appropriate placement is available

## 2021-12-29 NOTE — ED NOTES
Patient fixated on oxygen tank below bed frame. Tank removed and patient assured that he is in a safe place. Patient appreciative and calm in room.

## 2021-12-29 NOTE — TELEPHONE ENCOUNTER
414pm - Naegele paged   424pm - Naegele accepts     R: 4A/Nadir/Naegele    Pt placed in queue   429pm - unit charge notified, midnight for report   432pm - ED notified

## 2021-12-30 ENCOUNTER — HOSPITAL ENCOUNTER (INPATIENT)
Facility: CLINIC | Age: 21
LOS: 8 days | Discharge: HOME | End: 2022-01-07
Attending: PSYCHIATRY & NEUROLOGY | Admitting: PSYCHIATRY & NEUROLOGY
Payer: MEDICAID

## 2021-12-30 VITALS
HEART RATE: 92 BPM | DIASTOLIC BLOOD PRESSURE: 78 MMHG | RESPIRATION RATE: 18 BRPM | OXYGEN SATURATION: 100 % | SYSTOLIC BLOOD PRESSURE: 121 MMHG | TEMPERATURE: 97.6 F

## 2021-12-30 DIAGNOSIS — F19.951 SUBSTANCE-INDUCED PSYCHOTIC DISORDER WITH HALLUCINATIONS (H): ICD-10-CM

## 2021-12-30 DIAGNOSIS — F41.9 ANXIETY: Primary | ICD-10-CM

## 2021-12-30 PROBLEM — F12.90 MARIJUANA USE: Status: ACTIVE | Noted: 2021-12-30

## 2021-12-30 PROCEDURE — 250N000013 HC RX MED GY IP 250 OP 250 PS 637: Performed by: CLINICAL NURSE SPECIALIST

## 2021-12-30 PROCEDURE — 124N000002 HC R&B MH UMMC

## 2021-12-30 RX ORDER — AMOXICILLIN 250 MG
1 CAPSULE ORAL 2 TIMES DAILY PRN
Status: DISCONTINUED | OUTPATIENT
Start: 2021-12-30 | End: 2022-01-07 | Stop reason: HOSPADM

## 2021-12-30 RX ORDER — ACETAMINOPHEN 325 MG/1
650 TABLET ORAL EVERY 4 HOURS PRN
Status: DISCONTINUED | OUTPATIENT
Start: 2021-12-30 | End: 2022-01-07 | Stop reason: HOSPADM

## 2021-12-30 RX ORDER — MAGNESIUM HYDROXIDE/ALUMINUM HYDROXICE/SIMETHICONE 120; 1200; 1200 MG/30ML; MG/30ML; MG/30ML
30 SUSPENSION ORAL EVERY 4 HOURS PRN
Status: DISCONTINUED | OUTPATIENT
Start: 2021-12-30 | End: 2022-01-07 | Stop reason: HOSPADM

## 2021-12-30 RX ORDER — HYDROXYZINE HYDROCHLORIDE 25 MG/1
25-50 TABLET, FILM COATED ORAL EVERY 4 HOURS PRN
Status: DISCONTINUED | OUTPATIENT
Start: 2021-12-30 | End: 2022-01-07 | Stop reason: HOSPADM

## 2021-12-30 RX ORDER — LANOLIN ALCOHOL/MO/W.PET/CERES
3 CREAM (GRAM) TOPICAL
Status: DISCONTINUED | OUTPATIENT
Start: 2021-12-30 | End: 2022-01-07 | Stop reason: HOSPADM

## 2021-12-30 RX ORDER — GABAPENTIN 300 MG/1
300 CAPSULE ORAL 3 TIMES DAILY
Status: DISCONTINUED | OUTPATIENT
Start: 2021-12-30 | End: 2022-01-07 | Stop reason: HOSPADM

## 2021-12-30 RX ORDER — OLANZAPINE 10 MG/1
10 TABLET, ORALLY DISINTEGRATING ORAL AT BEDTIME
Status: DISCONTINUED | OUTPATIENT
Start: 2021-12-30 | End: 2021-12-31

## 2021-12-30 RX ORDER — OLANZAPINE 5 MG/1
5-10 TABLET ORAL 3 TIMES DAILY PRN
Status: DISCONTINUED | OUTPATIENT
Start: 2021-12-30 | End: 2022-01-07 | Stop reason: HOSPADM

## 2021-12-30 RX ORDER — OLANZAPINE 10 MG/2ML
10 INJECTION, POWDER, FOR SOLUTION INTRAMUSCULAR 3 TIMES DAILY PRN
Status: DISCONTINUED | OUTPATIENT
Start: 2021-12-30 | End: 2022-01-07 | Stop reason: HOSPADM

## 2021-12-30 RX ORDER — OLANZAPINE 5 MG/1
5 TABLET, ORALLY DISINTEGRATING ORAL DAILY
Status: DISCONTINUED | OUTPATIENT
Start: 2021-12-30 | End: 2021-12-31

## 2021-12-30 RX ADMIN — GABAPENTIN 300 MG: 300 CAPSULE ORAL at 14:40

## 2021-12-30 RX ADMIN — OLANZAPINE 10 MG: 5 TABLET, FILM COATED ORAL at 06:05

## 2021-12-30 RX ADMIN — GABAPENTIN 300 MG: 300 CAPSULE ORAL at 19:46

## 2021-12-30 RX ADMIN — OLANZAPINE 10 MG: 10 TABLET, ORALLY DISINTEGRATING ORAL at 19:46

## 2021-12-30 RX ADMIN — MELATONIN TAB 3 MG 3 MG: 3 TAB at 19:46

## 2021-12-30 RX ADMIN — HYDROXYZINE HYDROCHLORIDE 50 MG: 25 TABLET, FILM COATED ORAL at 06:05

## 2021-12-30 RX ADMIN — OLANZAPINE 5 MG: 5 TABLET, ORALLY DISINTEGRATING ORAL at 12:30

## 2021-12-30 ASSESSMENT — ACTIVITIES OF DAILY LIVING (ADL)
LAUNDRY: UNABLE TO COMPLETE
ORAL_HYGIENE: INDEPENDENT
LAUNDRY: UNABLE TO COMPLETE
HYGIENE/GROOMING: INDEPENDENT
ORAL_HYGIENE: INDEPENDENT
DRESS: SCRUBS (BEHAVIORAL HEALTH);INDEPENDENT
DRESS: INDEPENDENT
HYGIENE/GROOMING: INDEPENDENT

## 2021-12-30 ASSESSMENT — MIFFLIN-ST. JEOR: SCORE: 1630.39

## 2021-12-30 NOTE — PHARMACY-ADMISSION MEDICATION HISTORY
Please see Admission Medication History note completed on 12/28/21 under previous encounter at [facility] for information regarding prior to admission medications.    Nicollette McMann, PharmD  [facility]: Ascom *[phone]

## 2021-12-30 NOTE — PLAN OF CARE
Assessment/Intervention/Current Symtoms and Care Coordination    The patient's care was discussed with the treatment team and chart notes were reviewed.    Completed team note, psychosocial assessment and personal plan of care    Petition for commitment with [facility] was initiated. Writer completed exhibit A and faxed all paperwork to [facility]. Writer called [facility] to initiate petition.    Discharge Plan or Goal  TBD    Barriers to Discharge   Patient is newly admitted and evaluation is in process. Petition for commitment was initiated.    Referral Status  Petition for commitment started with [facility]    Legal Status    72 hour hold
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
## 2021-12-30 NOTE — PLAN OF CARE
Problem: General Plan of Care (Inpatient Behavioral)  Goal: Team Discussion  Description: Team Plan:  Note: BEHAVIORAL TEAM DISCUSSION    Participants: Debra Naegele APRN, Maria Luisa DOCKERY, Carolyn Hastings RN, Rachael Perdue OT  Progress: New admit  Anticipated length of stay: 3-5 days  Continued Stay Criteria/Rationale: Florinda and paranoia  Medical/Physical: No acute medical concerns  Precautions:   Behavioral Orders   Procedures    Code 1 - Restrict to Unit    Routine Programming     As clinically indicated    Status 15     Every 15 minutes.    Status Individual Observation     Patient SIO status reviewed with team/RN.  Please also refer to RN/team documentation for add'l detail.    -SIO staff to monitor following which have contributed to patient being on SIO:  Thought disorder, agitation  -Possible interventions SIO staff could use to support patient's treatment progress:  redirection  -When following observed, team will review discontinuation of SIO:  When patientno longer is acute risk to self or others     Order Specific Question:   CONTINUOUS 24 hours / day     Answer:   5 feet     Order Specific Question:   Indications for SIO     Answer:   Assault risk    Suicide precautions     Patients on Suicide Precautions should have a Combination Diet ordered that includes a Diet selection(s) AND a Behavioral Tray selection for Safe Tray - with utensils, or Safe Tray - NO utensils       Plan: Psychiatric Assessment. Medication Management. Therapeutic Mileu. Individual and group support.   Rationale for change in precautions or plan: Initial plan

## 2021-12-30 NOTE — PLAN OF CARE
Patient continue to be on SIO for thoughts disorder and agitation.  Flat affect, denies SI/SIB, depression/anxiety.  Patient stayed in room most of the time this shift sleeping.  Good appetite, took scheduled medication with no problem.  Patient can be paranoid at times.  No aggressive behavior noted.  Will continue to monitor closely.

## 2021-12-30 NOTE — PROGRESS NOTES
12/30/21 0525   Patient Belongings   Did you bring any home meds/supplements to the hospital?  No   Patient Belongings other (see comments)  (Patient belongings put in patient bin)   Belongings Search Yes   Clothing Search Yes   Second Staff Mitch DING       Ndiaye/White Cowboy Boots-1 pair  Black Beaded Necklace with Cross-1  Faded Glory Black Flannel Shorts with strings-1 pair  Blue Sweatpants-1 pair  After Visit Care Summary Paperwork-1   1 white and brown wristwatch     Security Envelope [id number]  (1) [employer]    A               Admission:  I am responsible for any personal items that are not sent to the safe or pharmacy.  [facility] is not responsible for loss, theft or damage of any property in my possession.    Signature:  _________________________________ Date: _______  Time: _____                                              Staff Signature:  ____________________________ Date: ________  Time: _____      2nd Staff person, if patient is unable/unwilling to sign:    Signature: ________________________________ Date: ________  Time: _____     Discharge:  [facility] has returned all of my personal belongings:    Signature: _________________________________ Date: ________  Time: _____                                          Staff Signature:  ____________________________ Date: ________  Time: _____

## 2021-12-30 NOTE — PLAN OF CARE
"Pt calm, pleasant and cooperative. Denies SI/SIB/AH/VH/HI. Blunted affect. Pt seems distracted during conversation, and at times shakes his head back and forth while talking but adamantly denies internal stimuli. Pt denies any mental health concerns or symptoms. Pt did endorse feeling a bit dizzy when he stood up after receiving gabapentin on day shift but denies dizziness this shift. Encouraged pt to rise/stand slowly and alert staff if he feels dizzy again. Also encouraged pt to drink plenty of fluids which he reports he has been doing and has a large cup of water with him. Pt declined his dinner tray because he didn't like it. Pt received a prepackaged Kosher dinner on his tray. Asked pt if he eats a Kosher diet and pt states \"what is that?\" Asked pt if he is Adventism. Pt states \"no, I'm Palauan\". Pt did accept a Nutrigrain Bar for something to eat but declined other snacks. Kosher diet still ordered, as pt likely has Kosher diet ordered for his paranoia, as this diet order is prepackaged. Pt continues to endorse paranoid thinking and states to psych associate tonight, \"is this hospital going to kill me?\" Pt reassured by psych associate and then states \"the last hospital tried to kill me\". Pt isolative to his room most of shift and withdrawn when he comes out to the milieu.     When giving patient his HS medication, pt asks \"are you going to be doing surgery on me?\" Informed pt that he's in the hospital to help him with his mental health, not surgery. Asked pt if he thinks he needs surgery to which pt states \"no\". Pt then took night medications without issue.  "

## 2021-12-30 NOTE — PROGRESS NOTES
Pt came out to get his breakfast tray this morning. When pt was walking past another peer in front of the nursing station he jerked away as if the peer jumped at him. Pt appears very hesitant and paranoid. Pt went back to his room with his tray. Prn Zprexa was administered but pt spit out.
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
## 2021-12-30 NOTE — PROGRESS NOTES
"New Admit:Juan Carlos is a 21yr old male admitting on a 72HH to [facility] (Unit 4AW) from [facility] ED. Pt arrives on the unit, very manic,dis regulated,loud, paranoid.Writer unable to complete admission due to pt current status.    0605; PRN Zyprexa 10mg and hydroxyzine 50mg administered at this time. Reported to oncoming staff, will continue to monitor.    SIO Order obtained, Pt tolerates redirections from staff.    COVID is Negative  Uttox positive for Cannabis  Potassium 3.3, On call provider notified. Pt is asymptomatic.will continue to monitor.    0723;Pt is still manic, in and out of his room (SIO). Pt states \"whatever you gave me didn't help.. it made my anxiety worse\". \"I don't feel good\",     Vitals /53 P105 T98.8 RR20 sats 98%RA. Will continue to monitor        "

## 2021-12-31 PROCEDURE — 250N000013 HC RX MED GY IP 250 OP 250 PS 637: Performed by: CLINICAL NURSE SPECIALIST

## 2021-12-31 PROCEDURE — 99223 1ST HOSP IP/OBS HIGH 75: CPT | Performed by: CLINICAL NURSE SPECIALIST

## 2021-12-31 PROCEDURE — 124N000002 HC R&B MH UMMC

## 2021-12-31 RX ORDER — OLANZAPINE 10 MG/1
10 TABLET, ORALLY DISINTEGRATING ORAL 2 TIMES DAILY
Status: DISCONTINUED | OUTPATIENT
Start: 2021-12-31 | End: 2022-01-06

## 2021-12-31 RX ADMIN — HYDROXYZINE HYDROCHLORIDE 50 MG: 25 TABLET, FILM COATED ORAL at 01:21

## 2021-12-31 RX ADMIN — OLANZAPINE 10 MG: 10 TABLET, ORALLY DISINTEGRATING ORAL at 21:08

## 2021-12-31 RX ADMIN — GABAPENTIN 300 MG: 300 CAPSULE ORAL at 21:08

## 2021-12-31 RX ADMIN — OLANZAPINE 10 MG: 5 TABLET, FILM COATED ORAL at 10:49

## 2021-12-31 RX ADMIN — HYDROXYZINE HYDROCHLORIDE 50 MG: 25 TABLET, FILM COATED ORAL at 17:04

## 2021-12-31 RX ADMIN — HYDROXYZINE HYDROCHLORIDE 50 MG: 25 TABLET, FILM COATED ORAL at 10:49

## 2021-12-31 RX ADMIN — GABAPENTIN 300 MG: 300 CAPSULE ORAL at 08:02

## 2021-12-31 RX ADMIN — MELATONIN TAB 3 MG 3 MG: 3 TAB at 21:08

## 2021-12-31 RX ADMIN — GABAPENTIN 300 MG: 300 CAPSULE ORAL at 14:15

## 2021-12-31 RX ADMIN — OLANZAPINE 5 MG: 5 TABLET, ORALLY DISINTEGRATING ORAL at 08:03

## 2021-12-31 ASSESSMENT — ACTIVITIES OF DAILY LIVING (ADL)
HYGIENE/GROOMING: INDEPENDENT
DRESS: INDEPENDENT
LAUNDRY: WITH SUPERVISION
ORAL_HYGIENE: INDEPENDENT

## 2021-12-31 NOTE — PLAN OF CARE
Patient continues on SIO 1:1 due to paranoia, agitation. Patient compliant with mouth checks after medication administration.     Problem: Anxiety  Goal: Anxiety Reduction or Resolution  Outcome: Declining; patient displays symptoms of anxiety and per staff report; he has displayed head banging and putting pillow case wrapped around head. Please see staff note for specific details. Informed provider and collogues in team.     1045 assessed patient due to patient telling staff he wants a medication. Patient had darting eyes, appears restless, reports not feeling safe and is scared about someone wanting to hurt him. Provided PRN Zyprexa 10 mg and Hydroxyzine 50 mg due to patient asking for something to help. Patient took medications and chewed them and writer completed mouth check to make sure pills were swallowed.      Patient denies SI, SIB, HI, and depression but appears to be struggling with anxiety.

## 2021-12-31 NOTE — PROGRESS NOTES
"[facility] [place]   Psychiatric Progress Note        Interim History:   The patient's care was discussed with the treatment team during the daily team meeting and/or staff's chart notes were reviewed.  Staff report patient isolating to room due to paranoia.     Psychiatric symptoms and interventions:   Upon interview with patient was afraid to come out of his room. He looked at the ceiling and decided it was not safe to leave his room. Patient is distracted, reports feeling uncomfortable in his own skin, anxiety is high, paranoia that people are after him or targeting him.     Patient had at least two panic attacks where he was hyperventilating.       Provider discussed 1:1 in team meeting. Patient continues to be a safety concern.     Zyprexa ODT 10 mg BID          Medications:       gabapentin  300 mg Oral TID     OLANZapine zydis  10 mg Oral At Bedtime     OLANZapine zydis  5 mg Oral Daily          Allergies:     Allergies   Allergen Reactions     Penicillins           Labs:   No results found for this or any previous visit (from the past 24 hour(s)).       Psychiatric Examination:     /81 (BP Location: Left arm)   Pulse 91   Temp 98  F (36.7  C) (Oral)   Resp 20   Ht 1.676 m (5' 6\")   Wt 69.3 kg (152 lb 11.2 oz)   SpO2 97%   BMI 24.65 kg/m    Weight is 152 lbs 11.2 oz  Body mass index is 24.65 kg/m .  Orthostatic Vitals  Report    None            Appearance: awake, alert, adequately groomed and dressed in hospital scrubs  Attitude:  guarded  Eye Contact:  intense  Mood:  anxious and with panic attacks  Affect:  intensity is exaggerated and intensity is heightened  Speech:  pressured speech  Psychomotor Behavior:  no evidence of tardive dyskinesia, dystonia, or tics  Throught Process:  disorganized  Associations:  loosening of associations present, paranoia  Thought Content:  no evidence of suicidal ideation or homicidal ideation and auditory hallucinations " present  Insight:  limited  Judgement:  limited  Oriented to:  time, person, and place  Attention Span and Concentration:  limited  Recent and Remote Memory:  fair    Clinical Global Impressions  First:     Most recent:            Precautions:     Behavioral Orders   Procedures     Assault precautions     Patient is paranoid.     Code 1 - Restrict to Unit     Routine Programming     As clinically indicated     Status 15     Every 15 minutes.     Status Individual Observation     Patient SIO status reviewed with team/RN.  Please also refer to RN/team documentation for add'l detail.    -SIO staff to monitor following which have contributed to patient being on SIO:  Thought disorder, agitation  -Possible interventions SIO staff could use to support patient's treatment progress:  redirection  -When following observed, team will review discontinuation of SIO:  When patientno longer is acute risk to self or others     Order Specific Question:   CONTINUOUS 24 hours / day     Answer:   Other     Order Specific Question:   Specify distance     Answer:   10 feet     Order Specific Question:   Indications for SIO     Answer:   Assault risk     Suicide precautions     Patients on Suicide Precautions should have a Combination Diet ordered that includes a Diet selection(s) AND a Behavioral Tray selection for Safe Tray - with utensils, or Safe Tray - NO utensils            DIagnoses:   1.  Psychosis, unspecified.  2.  Rule out substance-induced psychosis.  3.  Rule out schizophreniform disorder.  4.  Stimulant use disorder, severe.  5.  Cannabis use disorder, severe         Plan:   Legal status: The patient has been admitted to Behavioral Unit 4A on a 72-hour hold.  Provider  petitioned for  MICDcommitment on 12/30/2021 with [facility].    Medication maagemnet:   Gabapentin 300 mg TID for anxiety   Zyprexa 10 mg BID HS for mood instability, parnoia, psychosis  PRN Zyprexa available     Medical:   No acute issues.  Reviewed admission  labs. Potassium 3.3, low; calcium 8.3, low.  CBC with diff within normal limits except WBC 15.5, elevated; ANC 12.6, elevated, will monitor for signs of infection,  COVID screen negative.  12/30 Kosher diet due to paranoia. Patient believes that people are trying to poison him.     Behavioral/ psychology/ social:   Patient is not appropriate for groups due to his paranoia.   1:1 due due to impulsivity, paranoia, psychosis, and safety concerns.  Precautions: assault, suicide, cheeking    Disposition:   Reason for continued hospitalization: Patient is paranoid , psychotic. He is unable to care for self. He is at high risk of harming others.   Stabilization with medications, provide structured and supportive environment, groups  Estimated length of stay is 3-5 days   Discharge plan- TBD  ]

## 2021-12-31 NOTE — PLAN OF CARE
Assessment/Intervention/Current Symptoms and Care Coordination  Attend Team Meeting  Review Chart  FAX signed [facility]                Discharge Plan or Goal  Pending Civil Commitment              Barriers to Discharge   Pt is very ill.  Shows little progress.      Referral Status  Pending      Legal Status  72 HH[facility]
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
## 2021-12-31 NOTE — PROGRESS NOTES
"SIO staff (Noris) reports to writer that pt has pillow case wrapped around his mouth back to his neck while laying in bed, two staff approach pt, pt states he has no ill intention, pt is paranoid, reports feeling like \"he's being attacked\" from what looks like a  Speaker in his room ceiling, staff reassures pt that he's in a safe place (hospital) and staff are doing everything  to make sure that he and every pt remains safe while at the hospital, pt agreeable to take it off. No injuries noted,pt is on a 1:1 for paranoia and and to keep him safe. Reported to pt's nurse, will continue to monitor.    Pt's nurse, asks staff to keep linens including pillow case in pt's room and keep pt safe as per the discussion in team as per the unit provider.    Writer notes pt tying pillow case around his face again for the second time while sitting on SIO, pt is reminded not to tie linens around his face for safety. Pt is agreeable,states he his not doing it to hurt himself. RN Notified again for the second time,RN asked if we should have a pt care order to have linens out of pt's room for safety. RN tells writer the provider wants the pt linen in the room since the pt is on SIO, \"there's no safety concerns\". Writer takes out pillow cases and extra linen out of pt's room, 1 fitted sheet and unit blanket left in room during routine room checks for safety, pt is okay with this. RN is notified, will continue to monitor.    Pt on suicide precaution.    "

## 2022-01-01 PROCEDURE — 124N000002 HC R&B MH UMMC

## 2022-01-01 PROCEDURE — 250N000013 HC RX MED GY IP 250 OP 250 PS 637: Performed by: CLINICAL NURSE SPECIALIST

## 2022-01-01 RX ORDER — QUETIAPINE FUMARATE 50 MG/1
50 TABLET, FILM COATED ORAL 3 TIMES DAILY PRN
Status: DISCONTINUED | OUTPATIENT
Start: 2022-01-01 | End: 2022-01-07 | Stop reason: HOSPADM

## 2022-01-01 RX ADMIN — MELATONIN TAB 3 MG 3 MG: 3 TAB at 18:58

## 2022-01-01 RX ADMIN — OLANZAPINE 10 MG: 10 TABLET, ORALLY DISINTEGRATING ORAL at 18:58

## 2022-01-01 RX ADMIN — HYDROXYZINE HYDROCHLORIDE 50 MG: 25 TABLET, FILM COATED ORAL at 11:19

## 2022-01-01 RX ADMIN — GABAPENTIN 300 MG: 300 CAPSULE ORAL at 07:03

## 2022-01-01 RX ADMIN — GABAPENTIN 300 MG: 300 CAPSULE ORAL at 18:58

## 2022-01-01 RX ADMIN — OLANZAPINE 10 MG: 10 TABLET, ORALLY DISINTEGRATING ORAL at 07:03

## 2022-01-01 RX ADMIN — GABAPENTIN 300 MG: 300 CAPSULE ORAL at 13:09

## 2022-01-01 RX ADMIN — OLANZAPINE 10 MG: 5 TABLET, FILM COATED ORAL at 13:09

## 2022-01-01 ASSESSMENT — ACTIVITIES OF DAILY LIVING (ADL)
HYGIENE/GROOMING: INDEPENDENT
DRESS: SCRUBS (BEHAVIORAL HEALTH)
ORAL_HYGIENE: INDEPENDENT
HYGIENE/GROOMING: INDEPENDENT
LAUNDRY: WITH SUPERVISION
DRESS: SCRUBS (BEHAVIORAL HEALTH)
ORAL_HYGIENE: INDEPENDENT

## 2022-01-01 NOTE — PLAN OF CARE
"Juan Carlos is very paranoia this morning, worried that the people here want to hurt him, and when he gets out, that people will hurt him too. He is distrustful/cautious of all staff. He is oriented to place/time/person. But he's unsure why he's here. He breaks into moments where he is rapidly breathing and fearful suddenly. He is redirectable with the help of the staff helping to remind him to take deep slow breaths. He spends a lot of time in his room.   Startles easily. Wants to \"just go to custodial already\" and apologizes to staff for being so difficult.     PRN meds: Hydroxyzine given for anxiety in AM  Zyprexa given for anxiety/agitation in PM  "

## 2022-01-01 NOTE — PROGRESS NOTES
PT said that they are going to be the first one to commit suicide on the unit. Staff asked if they had a plan. PT denied having a plan. PT explained further and said that when he gets out that he is scared that people are going to kill him because he snitched on his boss's brother for selling meth. PT also stated that he is scared that the police are going to be after him and hurt him when he gets out of the hospital as well.

## 2022-01-01 NOTE — PLAN OF CARE
"  Problem: Anxiety  Goal: Anxiety Reduction or Resolution  Outcome: No Change  Intervention: Promote Anxiety Reduction  Recent Flowsheet Documentation  Taken 12/31/2021 2119 by Nury John RN  Supportive Measures:   active listening utilized   decision-making supported   positive reinforcement provided   self-care encouraged   verbalization of feelings encouraged     Problem: Anxiety  Goal: Anxiety Reduction or Resolution  Intervention: Promote Anxiety Reduction  Recent Flowsheet Documentation  Taken 12/31/2021 2119 by Nury John RN  Supportive Measures:   active listening utilized   decision-making supported   positive reinforcement provided   self-care encouraged   verbalization of feelings encouraged     Problem: Suicidal Behavior  Goal: Suicidal Behavior is Absent or Managed  Outcome: Improving   Patient isolative to room. Patient affect flat, blunted. Patient stated mood is anxious. Patient denies SI, SIB, HI, or hallucinations. Patient does appear to be responding to internal stimuli. Patient also gives minimal verbal responses with assessment questions and is distractable. Patient VSS, appetite fair, patient denies pain. Patient continues on an SIO for safety concerns. Patient has not been observed demonstrating any aggressive behavior. No yelling, no verbal statements of wanting to harm others. Patient was given prn hydroxyzine 50 mg times one for anxiety, with relief. Patient did briefly become irritable with writer gave patient his HS medications. Patient was able to calm quickly. Patient was given prn melatonin 3 mg with HS medications. Patient denies any additional concerns. /76 (BP Location: Left arm)   Pulse 89   Temp 99  F (37.2  C) (Temporal)   Resp 20   Ht 1.676 m (5' 6\")   Wt 69.3 kg (152 lb 11.2 oz)   SpO2 97%   BMI 24.65 kg/m     "

## 2022-01-01 NOTE — PROGRESS NOTES
AM medications administered due to pt showing visible signs of anxiety/panic attacks and emotional dysregulation.  Pt is also requesting a call to the [facility] [employer] to settle an overdue traffic ticket.  RN told pt that his request will be discussed monday morning with his provider.  Pt is in agreement with this plan.  See flow sheet for vitals.  Will continue to monitor and offer support as needed.
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
## 2022-01-02 PROCEDURE — 124N000002 HC R&B MH UMMC

## 2022-01-02 PROCEDURE — 250N000013 HC RX MED GY IP 250 OP 250 PS 637: Performed by: CLINICAL NURSE SPECIALIST

## 2022-01-02 RX ADMIN — GABAPENTIN 300 MG: 300 CAPSULE ORAL at 20:14

## 2022-01-02 RX ADMIN — OLANZAPINE 10 MG: 10 TABLET, ORALLY DISINTEGRATING ORAL at 20:14

## 2022-01-02 RX ADMIN — OLANZAPINE 10 MG: 10 TABLET, ORALLY DISINTEGRATING ORAL at 08:58

## 2022-01-02 RX ADMIN — GABAPENTIN 300 MG: 300 CAPSULE ORAL at 15:57

## 2022-01-02 RX ADMIN — ACETAMINOPHEN 650 MG: 325 TABLET, FILM COATED ORAL at 18:15

## 2022-01-02 RX ADMIN — GABAPENTIN 300 MG: 300 CAPSULE ORAL at 08:58

## 2022-01-02 RX ADMIN — OLANZAPINE 10 MG: 5 TABLET, FILM COATED ORAL at 04:47

## 2022-01-02 RX ADMIN — HYDROXYZINE HYDROCHLORIDE 50 MG: 25 TABLET, FILM COATED ORAL at 11:56

## 2022-01-02 RX ADMIN — MELATONIN TAB 3 MG 3 MG: 3 TAB at 20:14

## 2022-01-02 ASSESSMENT — ACTIVITIES OF DAILY LIVING (ADL)
DRESS: SCRUBS (BEHAVIORAL HEALTH)
ORAL_HYGIENE: PROMPTS
HYGIENE/GROOMING: PROMPTS
DRESS: SCRUBS (BEHAVIORAL HEALTH)
ORAL_HYGIENE: PROMPTS
LAUNDRY: WITH SUPERVISION
HYGIENE/GROOMING: PROMPTS

## 2022-01-02 ASSESSMENT — MIFFLIN-ST. JEOR: SCORE: 1637.65

## 2022-01-02 NOTE — PLAN OF CARE
"Shift update:  Juan Carlos is still quite paranoid about staff and people trying to or wanting to hurt him. Although he willingly takes his medications. This morning after his medication, he appeared to improve and be a little calmer, but still not in touch with reality, but less labile/irritated. No aggression. Redirectable, but the SIO staff is still needed to keep a constant watch on him.     Declined the afternoon dose of Gabapentin because he says it makes him feel \"weird\" with pressure in his chest and anxious. Took the AM dose and other meds without issues.     Update: Juan Carlos has continued to show a lot more control and calmness throughout the day shift. More organized and less paranoid.     Mood/Affect: can be irritable/labile at times early in the morning    SI: no   SIB: no     Hallucinations/Psychosis: denies and does not appear to be responding    Activity/Participation: Out in milieu most of the time but can not participate in group.   PRN Meds:   Appetite: good          "

## 2022-01-02 NOTE — PLAN OF CARE
"  Problem: Suicidal Behavior  Goal: Suicidal Behavior is Absent or Managed  Outcome: Improving    Behavioral  Pt sleeping in his room at the beginning of the shift. He continues to be on 1:1 SIO for SI, assault precautions. Pt said he was anxious from being here and at one point said he has no freedom to do things freely from the unit like the writer would. Pt said depression was \"a little bit.\" SIO staff reported that pt was heard talking to himself in the room and was paranoid. He was saying things like, \"they will come and kill me tonight.\" Pt continues to be paranoid and intact is poor. Pt appears suspicious of other people and other times getting excitement of minor things. He denies SI thoughts or AVH. Pt endorse good appetite.  Medical Alerts  None  Plan  Continue to monitor     "
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
## 2022-01-02 NOTE — PROGRESS NOTES
In the beginning of the day shift, PT told staff he was going to starve himself. PT also motioned his hand across his throat pretending to cut it. However, his mood seemed to improve through out the day and didn't try to follow through with starving himself.

## 2022-01-03 PROCEDURE — 124N000002 HC R&B MH UMMC

## 2022-01-03 PROCEDURE — 250N000013 HC RX MED GY IP 250 OP 250 PS 637: Performed by: NURSE PRACTITIONER

## 2022-01-03 PROCEDURE — 250N000013 HC RX MED GY IP 250 OP 250 PS 637: Performed by: CLINICAL NURSE SPECIALIST

## 2022-01-03 PROCEDURE — 99232 SBSQ HOSP IP/OBS MODERATE 35: CPT | Performed by: CLINICAL NURSE SPECIALIST

## 2022-01-03 RX ADMIN — QUETIAPINE FUMARATE 50 MG: 50 TABLET ORAL at 11:39

## 2022-01-03 RX ADMIN — OLANZAPINE 10 MG: 10 TABLET, ORALLY DISINTEGRATING ORAL at 20:09

## 2022-01-03 RX ADMIN — HYDROXYZINE HYDROCHLORIDE 50 MG: 25 TABLET, FILM COATED ORAL at 08:00

## 2022-01-03 RX ADMIN — GABAPENTIN 300 MG: 300 CAPSULE ORAL at 07:06

## 2022-01-03 RX ADMIN — GABAPENTIN 300 MG: 300 CAPSULE ORAL at 14:18

## 2022-01-03 RX ADMIN — GABAPENTIN 300 MG: 300 CAPSULE ORAL at 20:09

## 2022-01-03 RX ADMIN — MELATONIN TAB 3 MG 3 MG: 3 TAB at 20:09

## 2022-01-03 RX ADMIN — OLANZAPINE 10 MG: 10 TABLET, ORALLY DISINTEGRATING ORAL at 07:06

## 2022-01-03 RX ADMIN — HYDROXYZINE HYDROCHLORIDE 50 MG: 25 TABLET, FILM COATED ORAL at 20:09

## 2022-01-03 RX ADMIN — HYDROXYZINE HYDROCHLORIDE 50 MG: 25 TABLET, FILM COATED ORAL at 15:36

## 2022-01-03 ASSESSMENT — ACTIVITIES OF DAILY LIVING (ADL)
ORAL_HYGIENE: PROMPTS
HYGIENE/GROOMING: PROMPTS
DRESS: SCRUBS (BEHAVIORAL HEALTH)

## 2022-01-03 NOTE — PLAN OF CARE
"  Problem: Anxiety  Goal: Anxiety Reduction or Resolution  Intervention: Promote Anxiety Reduction  Recent Flowsheet Documentation  Taken 1/2/2022 2026 by Nury John RN  Supportive Measures:   active listening utilized   counseling provided   self-care encouraged   verbalization of feelings encouraged     Problem: Suicidal Behavior  Goal: Suicidal Behavior is Absent or Managed  Outcome: Improving   Patient visible in milieu, social with peers. Patient affect tense. Patient stated mood is \"anxious.\" Patient denies SI, SIB, HI, or hallucinations. Patient does not appear to be responding to internal stimuli. Patient continues to be on SIO for paranoid and suspicious behavior. Patient behavior is improving. Patient made no suspicious or paranoid statements. Patient admitted that \"weed made me schizo.\"  Patient's mom visited and patient reported it went well. Patient mood improved after visit. Patient did request to take 1400 gabapentin this evening. Patient was compliant with all other medication. He requested prn melatonin 3 mg for sleep. Was also given prn Tylenol 650 mg times on for a headache, with improvement. Appetite is good, VSS. Patient reports he feels better, and that the medications are helpful. Patient denies any additional concerns. /74 (BP Location: Left arm, Patient Position: Sitting)   Pulse 86   Temp 97.4  F (36.3  C) (Temporal)   Resp 16   Ht 1.676 m (5' 6\")   Wt 70 kg (154 lb 4.8 oz)   SpO2 98%   BMI 24.90 kg/m     "
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
## 2022-01-03 NOTE — PLAN OF CARE
"Number of patients attending the group:  4  Group Length:  0.5 Hours  No charge    Group Therapy     Summary of Group / Topics Discussed:      The  Psychotherapy group goal is to promote insight to positive choice and change. Group processing is within a supportive and safe environment. Patients will process emotions using verbal group and expressive psychotherapy interventions.        Assessment Gave group option of topics. Watched the relationship tree by [employer] video. Attempted to discuss the video; some group members discussed the difficulty with identifying whom they can trust. Group ended early per group request due to the ongoing disruptive comments by pt.             Patient Response- Pt expressed interest in the topic, was easily distracted throughout the video. Pt explicitly stated that the patient who was making comments and interrupting others was \"too much\" and so he left group.  "

## 2022-01-03 NOTE — PROGRESS NOTES
PT's suicidal thoughts continued today as he stated to staff that he wanted to go to sleep and never wake up.

## 2022-01-03 NOTE — PROGRESS NOTES
NOC Shift Report    Pt continues to be on SIO 10 ft for paranoia, assault risk, tolerates well. Pt in bed at beginning of shift, breathing quiet and unlabored. Pt slept through shift. Pt slept 7 hours.     No pt complaints or concerns at this time.     No PRNs given. Will continue to monitor.     Precautions: Suicide

## 2022-01-03 NOTE — PLAN OF CARE
Assessment/Intervention/Current Symtoms and Care Coordination  The patient's care was discussed with the treatment team and chart notes were reviewed.  Phone call and email communication with Jennifer Ruzi from [facility]. She screened the patient today and he reported he was willing to sign in voluntary, take medication and follow outpatient treatment recommendations.She requested that writer ask patient about his willingness to follow through to ensure patient is on board. Writer talked to patient and he agreed. He signed in voluntarily. He reported that his mother plans to move in with him to help him. He reported he needs to work to pay is bills. Writer reported back to Jennifer Ruiz who confirmed that she talked to mom and mom does not want patient to be committed and she will indeed move in with him to help support his mental health. Jennifer asked writer to have patient apply for MA at hospital. She will offer patient case management services. Jennifer sent writer a letter of non-support of the petition.  Writer placed referral for MA application with Michelle in [facility].     Discharge Plan or Goal  Home with outpatient support     Barriers to Discharge   Patient continues to stabilize.     Referral Status  Patient was referred for MA application through [facility]      Legal Status    Voluntary

## 2022-01-03 NOTE — PLAN OF CARE
"Shift update: Overall Juan Carlos is improving. He is much more calm overall, and says he feels \"a lot better\". He hasn't had made many statements about paranoia, but then he sat in a wheelchair that was on the unit that wasn't for him, and when he was redirected from the wheelchair, he thought he had done something terribly wrong and was fearful about getting \"locked up\". He apologized to staff about how hard he was as a patient. Then security came in to help escort another patient off the unit, and he instantly went into panic, eyes became wide and hyperventilating. He then laid down on the floor by the nurses station. He thought they were police there to take him to long-term. He was able to calm down with help and understood that the security were not there for him and they were not police.   He took hydroxyzine for anxiety, and is feeling better and is back to being calm/cooperative and at ease.     Mood/Affect: more anxious/paranoid this morning, napped this afternoon    SI: no   SIB: no     Activity/Participation: No participating in groups  PRN Meds: Hydroxyzine and Seroquel PRN for anxiety  Appetite: Doesn't want the packaged food anymore. He requested eggs for breakfast. Order changed and a new breakfast tray was given to him for which he ate without issues.          "

## 2022-01-04 PROCEDURE — 250N000013 HC RX MED GY IP 250 OP 250 PS 637: Performed by: CLINICAL NURSE SPECIALIST

## 2022-01-04 PROCEDURE — G0177 OPPS/PHP; TRAIN & EDUC SERV: HCPCS

## 2022-01-04 PROCEDURE — 250N000013 HC RX MED GY IP 250 OP 250 PS 637: Performed by: NURSE PRACTITIONER

## 2022-01-04 PROCEDURE — 99232 SBSQ HOSP IP/OBS MODERATE 35: CPT | Performed by: CLINICAL NURSE SPECIALIST

## 2022-01-04 PROCEDURE — 124N000002 HC R&B MH UMMC

## 2022-01-04 RX ADMIN — OLANZAPINE 10 MG: 10 TABLET, ORALLY DISINTEGRATING ORAL at 20:00

## 2022-01-04 RX ADMIN — MELATONIN TAB 3 MG 3 MG: 3 TAB at 18:24

## 2022-01-04 RX ADMIN — ACETAMINOPHEN 650 MG: 325 TABLET, FILM COATED ORAL at 08:07

## 2022-01-04 RX ADMIN — HYDROXYZINE HYDROCHLORIDE 50 MG: 25 TABLET, FILM COATED ORAL at 12:26

## 2022-01-04 RX ADMIN — QUETIAPINE FUMARATE 50 MG: 50 TABLET ORAL at 15:39

## 2022-01-04 RX ADMIN — GABAPENTIN 300 MG: 300 CAPSULE ORAL at 08:06

## 2022-01-04 RX ADMIN — GABAPENTIN 300 MG: 300 CAPSULE ORAL at 20:00

## 2022-01-04 RX ADMIN — OLANZAPINE 10 MG: 10 TABLET, ORALLY DISINTEGRATING ORAL at 08:06

## 2022-01-04 RX ADMIN — HYDROXYZINE HYDROCHLORIDE 50 MG: 25 TABLET, FILM COATED ORAL at 17:25

## 2022-01-04 RX ADMIN — GABAPENTIN 300 MG: 300 CAPSULE ORAL at 14:22

## 2022-01-04 ASSESSMENT — ACTIVITIES OF DAILY LIVING (ADL)
DRESS: SCRUBS (BEHAVIORAL HEALTH)
ORAL_HYGIENE: PROMPTS
HYGIENE/GROOMING: PROMPTS

## 2022-01-04 NOTE — PROGRESS NOTES
"[facility], [place]   Psychiatric Progress Note        Interim History:   The patient's care was discussed with the treatment team during the daily team meeting and/or staff's chart notes were reviewed.  Staff report patient is visible in the milieu . Patient has not been in group. Attention span too short and paranoia.     Psychiatric symptoms and interventions:   Upon interview with patient has lingering paranoia. Patient asked provider if I saw the \"video \". Provider said no but patient did not believe it. Patient has improved with being able to be present in the milieu without a panic attack. He has been cooperative with his medications and says the medications help both his paranoia and his anxiety.     Patient was educated on the detrimental effects of cannabis and meth having a negative impact on his mental health. Recommendation was to abstain from all mood altering drugs.     Treatment team reviewed 1:1: Discontinued 1:1. Sleep has improved. Patient is redirectable. He is in the milieu interacting appropriately.     Continue Zyprexa 10 mg BID   Gabapentin 300 mg TID         Medications:       gabapentin  300 mg Oral TID     OLANZapine zydis  10 mg Oral BID          Allergies:     Allergies   Allergen Reactions     Penicillins           Labs:   No results found for this or any previous visit (from the past 24 hour(s)).       Psychiatric Examination:     /59 (BP Location: Left arm)   Pulse 89   Temp 97.7  F (36.5  C) (Temporal)   Resp 16   Ht 1.676 m (5' 6\")   Wt 70 kg (154 lb 4.8 oz)   SpO2 99%   BMI 24.90 kg/m    Weight is 154 lbs 4.8 oz  Body mass index is 24.9 kg/m .  Orthostatic Vitals  Report      Most Recent      Standing Orthostatic /84 01/03 0833    Standing Orthostatic Pulse (bpm) 94 01/03 0833         Appearance: awake, alert, adequately groomed and dressed in hospital scrubs  Attitude:  cooperative  Eye Contact:  intense  Mood:  Patient reports his " anxiety is manageable.   Affect:  intensity is heightened  Speech:  less pressured speech  Psychomotor Behavior:  no evidence of tardive dyskinesia, dystonia, or tics  Throught Process:  more organized  Associations: no  loosening of associations present, lingering paranoia  Thought Content:  no evidence of suicidal ideation or homicidal ideation and auditory hallucinations present  Insight:  limited  Judgement:  limited  Oriented to:  time, person, and place  Attention Span and Concentration:  limited  Recent and Remote Memory:  fair    Clinical Global Impressions  First:     Most recent:            Precautions:     Behavioral Orders   Procedures     Assault precautions     Patient is paranoid.     Code 1 - Restrict to Unit     Routine Programming     As clinically indicated     Status 15     Every 15 minutes.     Status Individual Observation     Patient SIO status reviewed with team/RN.  Please also refer to RN/team documentation for add'l detail.    -SIO staff to monitor following which have contributed to patient being on SIO:  Thought disorder, agitation  -Possible interventions SIO staff could use to support patient's treatment progress:  redirection  -When following observed, team will review discontinuation of SIO:  When patientno longer is acute risk to self or others     Order Specific Question:   CONTINUOUS 24 hours / day     Answer:   Other     Order Specific Question:   Specify distance     Answer:   10 feet     Order Specific Question:   Indications for SIO     Answer:   Assault risk     Suicide precautions     Patients on Suicide Precautions should have a Combination Diet ordered that includes a Diet selection(s) AND a Behavioral Tray selection for Safe Tray - with utensils, or Safe Tray - NO utensils            DIagnoses:   1.  Psychosis, unspecified.  2.  Rule out substance-induced psychosis.  3.  Rule out schizophreniform disorder.  4.  Stimulant use disorder, severe.  5.  Cannabis use disorder,  severe         Plan:   Legal status: The patient has been admitted to Behavioral Unit 4A on a 72-hour hold.  Provider  petitioned for  MICDcommitment on 12/30/2021 with Gregorio.  1/3 Petition for commitment was not supported. Patient will sign in voluntary.      Medication maagemnet:   Gabapentin 300 mg TID for anxiety   Zyprexa 10 mg BID HS for mood instability, parnoia, psychosis  PRN Zyprexa available      Medical:   No acute issues.  Reviewed admission labs. Potassium 3.3, low; calcium 8.3, low.  CBC with diff within normal limits except WBC 15.5, elevated; ANC 12.6, elevated, will monitor for signs of infection,  COVID screen negative.  12/30 Kosher diet due to paranoia. Patient believes that people are trying to poison him.   1/3 DC'ed Kosher diet . He is able to eat unpackaged foods.      Behavioral/ psychology/ social:   Patient is not appropriate for groups due to his paranoia.   1:1 due due to impulsivity, paranoia, psychosis, and safety concerns.  Precautions: assault, suicide, cheeking  MA was approved.      Disposition:   Reason for continued hospitalization: Patient is paranoid , psychotic. He is unable to care for self. He is at high risk of harming others.   Stabilization with medications, provide structured and supportive environment, groups  Estimated length of stay is 3-5 days   Discharge plan- live with mother, MICD outpatient treatment.   ]

## 2022-01-04 NOTE — PROGRESS NOTES
Pt appeared to have slept for 6.75 hours.  Breathing is even and unlabored.  No medical concerns/prns this shift.  Remains on SIO with 10 feet restrictions.

## 2022-01-04 NOTE — PLAN OF CARE
Assessment/Intervention/Current Symtoms and Care Coordination    The patient's care was discussed with the treatment team and chart notes were reviewed.    Met patient to discuss his concerns with hospital bill and his finances. Writer informed patient that [facility] will contact patient regarding MA application. When his insurance is active, it will pay his hospital bill. Writer informed patient that Emergency Assistance through the [facility] can help with other bills. Patient calmed after receiving this information.    Voice message from Celia with [employer] [facility] who reported she completed the MA application with patient and it is now active. She will add the information to the chart. Patient and provider were informed.     Discharge Plan or Goal  Home with outpatient support     Barriers to Discharge   Stabilization of paranoia     Referral Status  Patient was referred for MA application through [facility]. It is now active.     Legal Status    Voluntary

## 2022-01-04 NOTE — PLAN OF CARE
Shift update: Juan Carlos is experiencing some anxiety today over finances and being able to afford this hospital stay and his medications. He spoke with the SW and financial department about this and this helped a little. He asked for hydroxyzine for his anxiety, and needed little help deescalating in his moments of panic. Doing well since his SIO as discontinued.   Less paranoia    Mood/Affect: full range, anxious    SI: no   SIB: no     Activity/Participation: visible in milieu, appropriate   PRN Meds: hydroxyzine and tylenol   Appetite: good

## 2022-01-04 NOTE — PLAN OF CARE
"  Problem: Anxiety  Goal: Anxiety Reduction or Resolution  Outcome: No Change     Pt anxious at the beginning of the shift and sitting in the hills on the floor. Pt requested and received PRN hydroxyzine. Pt later stated the hydroxyzine \"helped a lot.\" Pt stated \"I never knew this, but I guess I need medication to live.\" Pt was in and out of his room, visible in the milieu. Pt had a bright affect on approach. Pt agitated at times with another peer and would glare at the other peer, but was redirectable. Pt had a visit which appeared to go well. Pt denied any SI/SIB, denies any hallucinations. Pt stated \"when I take my meds I don't even think about anxiety. I used to think marijuana helped, but it doesn't.\" Pt calm and cooperative, medication compliant.  "

## 2022-01-04 NOTE — PLAN OF CARE
"INITIAL OT NOTE  Problem: Additional Goals  Goal: BEH OT Goal 1  Description: Work with pt to increase awareness of how symptoms can impact performance on goal-directed tasks and life management skills. Will provide opportunities to build on coping strategies to manage symptoms during hospitalization and after d/c.    Description: Juan Carlos attended 2 occupational therapy groups today.  Occupational therapy clinic. Purpose of structured group: exploration/development of positive coping skills, engagement in creative expression and clinical observation of social, cognitive, and kinesthetic performance skills. Pt response: Pt attended the last 20 minutes of group (no charge). When provided introduction to the group, pt had no difficulty self-selecting a beading project. He made a thoughtful bracelet for his mother. Demonstrated fair planning and focus throughout. Appeared more comfortable interacting and responding to staff/peers.   Mental health management group with a focus on setting an intention for the new year. Pt was instructed to think of one word that is their focus, goal, or mantra for the year and to create/illustrate what they intend to add, change, or let go in the next year to move toward that intention. Pt Response: Pt verbalized/demonstrated understanding of the activity, remained actively engaged and involved. Pt demonstrated comfort and trust with the group, evidenced by coming out as bisexual and reported this to be the first time he has said it out loud. Pt identified \"humble\" as his word of intent. Pt was encouraged to keep this word on display in a spot they frequent as a reminder. Pt remained an encouraging group member towards his peers.      Assessment: Demonstrates benefit from engagement in OT groups that support healthy recovery, specifically exploration of positive coping skills for symptom management, relapse prevention, and resumption of personal roles, routines and daily occupations. Pt " appears less paranoid.     Plan: OT staff will meet with pt to review the role of occupational therapy and explain the value of having them involved in their treatment plan including options to meet current needs/self-identified goals. As group attendance is established, continued clinical observations will be made and pt will be given self-assessment to inform OT initial assessment. Continue to offer graded occupation-based activities for ongoing assessment and increased success towards IP goals.

## 2022-01-04 NOTE — PLAN OF CARE
"  Problem: Anxiety  Goal: Anxiety Reduction or Resolution  Outcome: No Change     Pt some what agitated and anxious at the beginning of the shift. Pt requested PRN and received Seroquel @2679. Pt stated he is anxious about his finances. Pt denies any other mental health symptoms. Pt visible at times in the milieu, some what withdrawn to self. Pt anxious after dinner and requested and received hydroxyzine @8141. Pt continued to ask if \"his \" was the reason he was in the hospital. Pt also concerned he will never get to leave the hospital. Pt went to sleep early with no concerns. Pt woken up later for medication and was compliant. Pt began hyperventilating for a short time, but was able to fall back asleep. Will continue to monitor.  "

## 2022-01-05 PROCEDURE — 124N000002 HC R&B MH UMMC

## 2022-01-05 PROCEDURE — 250N000013 HC RX MED GY IP 250 OP 250 PS 637: Performed by: CLINICAL NURSE SPECIALIST

## 2022-01-05 PROCEDURE — 90853 GROUP PSYCHOTHERAPY: CPT

## 2022-01-05 PROCEDURE — 99232 SBSQ HOSP IP/OBS MODERATE 35: CPT | Performed by: CLINICAL NURSE SPECIALIST

## 2022-01-05 RX ADMIN — GABAPENTIN 300 MG: 300 CAPSULE ORAL at 13:21

## 2022-01-05 RX ADMIN — OLANZAPINE 10 MG: 10 TABLET, ORALLY DISINTEGRATING ORAL at 20:11

## 2022-01-05 RX ADMIN — GABAPENTIN 300 MG: 300 CAPSULE ORAL at 07:19

## 2022-01-05 RX ADMIN — NICOTINE POLACRILEX 4 MG: 2 GUM, CHEWING BUCCAL at 16:55

## 2022-01-05 RX ADMIN — HYDROXYZINE HYDROCHLORIDE 50 MG: 25 TABLET, FILM COATED ORAL at 15:57

## 2022-01-05 RX ADMIN — MELATONIN TAB 3 MG 3 MG: 3 TAB at 17:44

## 2022-01-05 RX ADMIN — OLANZAPINE 10 MG: 10 TABLET, ORALLY DISINTEGRATING ORAL at 07:19

## 2022-01-05 RX ADMIN — GABAPENTIN 300 MG: 300 CAPSULE ORAL at 20:11

## 2022-01-05 ASSESSMENT — ACTIVITIES OF DAILY LIVING (ADL)
DRESS: INDEPENDENT
HYGIENE/GROOMING: INDEPENDENT
ORAL_HYGIENE: INDEPENDENT
LAUNDRY: WITH SUPERVISION

## 2022-01-05 NOTE — PROGRESS NOTES
"[facility], [place]   Psychiatric Progress Note        Interim History:   The patient's care was discussed with the treatment team during the daily team meeting and/or staff's chart notes were reviewed.  Staff report patient is visible in the milieu. He is attending groups. No behavior issues.     Psychiatric symptoms and intervenitons:   Upon interview with patient he reports his anxiety is \"getting better' He continues to have lingering paranoia. Patient reports he wants to return to home and start work. Patient has been able to function in group. He is taking less about people targeting him in the community.     Patient reports medications have been helpful with both anxiety and paranoia. Patient uses PRNs frequently to help him mange his anxiety. Patient has not experienced panic attacks.     Continue gabapentin and Zyprexa. Patient has been cooperative with his medications.          Medications:       gabapentin  300 mg Oral TID     OLANZapine zydis  10 mg Oral BID          Allergies:     Allergies   Allergen Reactions     Penicillins           Labs:   No results found for this or any previous visit (from the past 24 hour(s)).       Psychiatric Examination:     /52   Pulse 89   Temp 99.1  F (37.3  C) (Temporal)   Resp 16   Ht 1.676 m (5' 6\")   Wt 70 kg (154 lb 4.8 oz)   SpO2 98%   BMI 24.90 kg/m    Weight is 154 lbs 4.8 oz  Body mass index is 24.9 kg/m .  Orthostatic Vitals  Report      Most Recent      Sitting Orthostatic /75 01/04 0858    Standing Orthostatic /56 01/05 0752    Standing Orthostatic Pulse (bpm) 97 01/05 0752           Appearance: awake, alert, adequately groomed and dressed in hospital scrubs  Attitude:  cooperative  Eye Contact:  intense  Mood:  Patient reports his anxiety is manageable.   Affect:  intensity is heightened  Speech:  less pressured speech  Psychomotor Behavior:  no evidence of tardive dyskinesia, dystonia, or tics  Throught " Process:  more organized  Associations: no  loosening of associations present, lingering paranoia  Thought Content:  no evidence of suicidal ideation or homicidal ideation and auditory hallucinations present  Insight:  limited  Judgement:  limited  Oriented to:  time, person, and place  Attention Span and Concentration:  limited  Recent and Remote Memory:  fair       Clinical Global Impressions  First:     Most recent:            Precautions:     Behavioral Orders   Procedures     Assault precautions     Patient is paranoid.     Code 1 - Restrict to Unit     Routine Programming     As clinically indicated     Status 15     Every 15 minutes.     Suicide precautions     Patients on Suicide Precautions should have a Combination Diet ordered that includes a Diet selection(s) AND a Behavioral Tray selection for Safe Tray - with utensils, or Safe Tray - NO utensils            DIagnoses:   1.  Psychosis, unspecified.  2.  Rule out substance-induced psychosis.  3.  Rule out schizophreniform disorder.  4.  Stimulant use disorder, severe.  5.  Cannabis use disorder, severe         Plan:   Legal status: The patient has been admitted to Behavioral Unit 4A on a 72-hour hold.  Provider  petitioned for  MICDcommitment on 12/30/2021 with Gregorio.  1/3 Petition for commitment was not supported. Patient will sign in voluntary.      Medication maagemnet:   Gabapentin 300 mg TID for anxiety   Zyprexa 10 mg BID HS for mood instability, parnoia, psychosis  PRN Zyprexa available      Medical:   No acute issues.  Reviewed admission labs. Potassium 3.3, low; calcium 8.3, low.  CBC with diff within normal limits except WBC 15.5, elevated; ANC 12.6, elevated, will monitor for signs of infection,  COVID screen negative.  12/30 Kosher diet due to paranoia. Patient believes that people are trying to poison him.   1/3 KS'ed Kosher diet . He is able to eat unpackaged foods.      Behavioral/ psychology/ social:   Patient is not appropriate for  groups due to his paranoia.   1:1 due due to impulsivity, paranoia, psychosis, and safety concerns.  Precautions: assault, suicide, cheeking  MA was approved.      Disposition:   Reason for continued hospitalization: Patient is paranoid , psychotic. He is unable to care for self. He is at high risk of harming others.   Stabilization with medications, provide structured and supportive environment, groups  Estimated length of stay is 3-5 days   Discharge plan- live with mother, MICD outpatient treatment.   ]

## 2022-01-05 NOTE — PLAN OF CARE
"Pt is calm, pleasant and cooperative. No paranoid comments noted this shift. Present in milieu and social with peers. Attending groups. Denies acute mental health concerns other than anxiety and states it's related to \"not knowing when I'm getting out of here\". Requested prn hydroxyzine 50mg for anxiety which is effective. He reports that hydroxyzine is more effective than gabapentin for his anxiety. Enjoyed a dog visiting the unit for pet therapy. Pt saw another pt receiving nicorette gum and requested this for himself, not realizing patient's are allowed to receive it on the unit. Pt was happy to get an order for this. Pt reports sleeping well but is requesting an increase in melatonin. Appetite good. Pt completed his Plan of Care this shift. Pt is requesting a COVID shot while he's here. Reported off to day shift for medical flyer RN to administer.  "

## 2022-01-05 NOTE — PLAN OF CARE
Assessment/Intervention/Current Symtoms and Care Coordination  The patient's care was discussed with the treatment team and chart notes were reviewed.  Met patient to check in. He was pleasant and indicated he was doing well.     Discharge Plan or Goal  Home with outpatient support     Barriers to Discharge   Stabilization of paranoia     Referral Status  Patient was referred for MA application through [employer] [facility]. It is now active.     Legal Status    Voluntary

## 2022-01-05 NOTE — PLAN OF CARE
"  Problem: Anxiety  Goal: Anxiety Reduction or Resolution  Outcome: No Change   Pt was up early this morning b/4 0700. He was given his morning meds. He perseverates on going back to work to pay the bills. He denies SI and states he will probably  stay here for a year but he feels safe here because \"if I'm out on the street, I will die because I'm a snitch\". \"I would rather live here.\" pt has been redirectable and ate his breakfast.  "

## 2022-01-06 VITALS
HEART RATE: 100 BPM | WEIGHT: 157.4 LBS | RESPIRATION RATE: 16 BRPM | BODY MASS INDEX: 25.3 KG/M2 | DIASTOLIC BLOOD PRESSURE: 52 MMHG | OXYGEN SATURATION: 96 % | TEMPERATURE: 98.1 F | HEIGHT: 66 IN | SYSTOLIC BLOOD PRESSURE: 121 MMHG

## 2022-01-06 PROCEDURE — 250N000013 HC RX MED GY IP 250 OP 250 PS 637: Performed by: CLINICAL NURSE SPECIALIST

## 2022-01-06 PROCEDURE — 99232 SBSQ HOSP IP/OBS MODERATE 35: CPT | Performed by: CLINICAL NURSE SPECIALIST

## 2022-01-06 PROCEDURE — 87591 N.GONORRHOEAE DNA AMP PROB: CPT | Performed by: CLINICAL NURSE SPECIALIST

## 2022-01-06 PROCEDURE — 124N000002 HC R&B MH UMMC

## 2022-01-06 PROCEDURE — 87491 CHLMYD TRACH DNA AMP PROBE: CPT | Performed by: CLINICAL NURSE SPECIALIST

## 2022-01-06 RX ORDER — GABAPENTIN 300 MG/1
300 CAPSULE ORAL 3 TIMES DAILY
Qty: 90 CAPSULE | Refills: 1 | Status: SHIPPED | OUTPATIENT
Start: 2022-01-06 | End: 2023-06-16

## 2022-01-06 RX ORDER — OLANZAPINE 10 MG/1
10 TABLET ORAL 2 TIMES DAILY
Status: DISCONTINUED | OUTPATIENT
Start: 2022-01-06 | End: 2022-01-07 | Stop reason: HOSPADM

## 2022-01-06 RX ORDER — HYDROXYZINE HYDROCHLORIDE 25 MG/1
25-50 TABLET, FILM COATED ORAL EVERY 4 HOURS PRN
Qty: 120 TABLET | Refills: 1 | Status: SHIPPED | OUTPATIENT
Start: 2022-01-06 | End: 2023-06-16

## 2022-01-06 RX ORDER — OLANZAPINE 10 MG/1
10 TABLET ORAL 2 TIMES DAILY
Qty: 60 TABLET | Refills: 1 | Status: SHIPPED | OUTPATIENT
Start: 2022-01-06 | End: 2023-06-16

## 2022-01-06 RX ADMIN — NICOTINE POLACRILEX 4 MG: 2 GUM, CHEWING BUCCAL at 13:08

## 2022-01-06 RX ADMIN — OLANZAPINE 10 MG: 10 TABLET, FILM COATED ORAL at 20:37

## 2022-01-06 RX ADMIN — GABAPENTIN 300 MG: 300 CAPSULE ORAL at 20:37

## 2022-01-06 RX ADMIN — OLANZAPINE 10 MG: 10 TABLET, ORALLY DISINTEGRATING ORAL at 07:44

## 2022-01-06 RX ADMIN — NICOTINE POLACRILEX 4 MG: 2 GUM, CHEWING BUCCAL at 17:06

## 2022-01-06 RX ADMIN — GABAPENTIN 300 MG: 300 CAPSULE ORAL at 07:45

## 2022-01-06 RX ADMIN — GABAPENTIN 300 MG: 300 CAPSULE ORAL at 14:41

## 2022-01-06 RX ADMIN — NICOTINE POLACRILEX 4 MG: 2 GUM, CHEWING BUCCAL at 07:45

## 2022-01-06 RX ADMIN — MELATONIN TAB 3 MG 3 MG: 3 TAB at 17:40

## 2022-01-06 ASSESSMENT — MIFFLIN-ST. JEOR: SCORE: 1651.71

## 2022-01-06 ASSESSMENT — ACTIVITIES OF DAILY LIVING (ADL)
ORAL_HYGIENE: INDEPENDENT
DRESS: INDEPENDENT
ORAL_HYGIENE: INDEPENDENT
LAUNDRY: WITH SUPERVISION
LAUNDRY: WITH SUPERVISION
HYGIENE/GROOMING: INDEPENDENT
HYGIENE/GROOMING: INDEPENDENT
DRESS: INDEPENDENT

## 2022-01-06 NOTE — PLAN OF CARE
Focus: Shift summary    Data: Patient slept 7 hours last night. No interventions needed. Respirations even and unlabored on status 15 checks. Will continue to monitor and report to oncoming staff.    Response: Report sleep hours to day shift. Continue to monitor patient and provide therapeutic interventions as necessary.

## 2022-01-06 NOTE — PLAN OF CARE
"Pt calm, pleasant and cooperative. Denies SI, hallucinations and any other acute mental health concerns. Full range affect. 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 Pt requested STD testing which was ordered and urine sent to lab. Continues to eat and sleep well. Med compliant. Pt is hoping to discharge before lunch tomorrow if possible. Discharge order is entered and discharge meds are on the unit.  "

## 2022-01-06 NOTE — PROGRESS NOTES
"[facility], [place]   Psychiatric Progress Note        Interim History:   The patient's care was discussed with the treatment team during the daily team meeting and/or staff's chart notes were reviewed.  Staff report patient is visible in the milieu and attends groups.     Psychiatric symptoms and interventions:   Upon interview with patient has been going to groups.  He is able to follow direction. Paranoia is decreased but at times he will have some paranoid thing regarding his work. Patient reports Zyprexa and gabapentin have really helped him with his anxiety, paranoid and \"making me so I am not crazy.\" Patient denies suicidal ideation.     Patient will discharge to home on Friday. Meds ordered.          Medications:       gabapentin  300 mg Oral TID     OLANZapine  10 mg Oral BID          Allergies:     Allergies   Allergen Reactions     Penicillins           Labs:   No results found for this or any previous visit (from the past 24 hour(s)).       Psychiatric Examination:     /61   Pulse 77   Temp 97.3  F (36.3  C)   Resp 16   Ht 1.676 m (5' 6\")   Wt 71.4 kg (157 lb 6.4 oz)   SpO2 98%   BMI 25.41 kg/m    Weight is 157 lbs 6.4 oz  Body mass index is 25.41 kg/m .  Orthostatic Vitals  Report      Most Recent      Standing Orthostatic /65 01/06 0745    Standing Orthostatic Pulse (bpm) 94 01/06 0745            Appearance: awake, alert, adequately groomed and dressed in hospital scrubs  Attitude:  cooperative  Eye Contact:  good  Mood:  better, stabilizing  Affect:  intensity is normal  Speech:  normal prosody  Psychomotor Behavior:  no evidence of tardive dyskinesia, dystonia, or tics  Throught Process:  goal oriented  Associations:  no loose associations , lingering paranoia  Thought Content:  no evidence of suicidal ideation or homicidal ideation, no auditory hallucinations present and no visual hallucinations present  Insight:  fair  Judgement:  fair  Oriented to:  " time, person, and place  Attention Span and Concentration:  fair  Recent and Remote Memory:  intact    Clinical Global Impressions  First:  Considering your total clinical experience with this particular patient population, how severe are the patient's symptoms at this time?: 3 (01/06/22 1151)  Compared to the patient's condition at the START of treatment, this patient's condition is: 3 (01/06/22 1151)  Most recent:  Considering your total clinical experience with this particular patient population, how severe are the patient's symptoms at this time?: 3 (01/06/22 1151)  Compared to the patient's condition at the START of treatment, this patient's condition is: 3 (01/06/22 1151)         Precautions:     Behavioral Orders   Procedures     Assault precautions     Patient is paranoid.     Code 1 - Restrict to Unit     Routine Programming     As clinically indicated     Status 15     Every 15 minutes.     Suicide precautions     Patients on Suicide Precautions should have a Combination Diet ordered that includes a Diet selection(s) AND a Behavioral Tray selection for Safe Tray - with utensils, or Safe Tray - NO utensils            DIagnoses:   1.  Psychosis, unspecified.  2.  Rule out substance-induced psychosis.  3.  Rule out schizophreniform disorder.  4.  Stimulant use disorder, severe.  5.  Cannabis use disorder, severe         Plan:   Legal status: The patient has been admitted to Behavioral Unit 4A on a 72-hour hold.  Provider  petitioned for  MICDcommitment on 12/30/2021 with Gregorio.  1/3 Petition for commitment was not supported. Patient will sign in voluntary.      Medication maagemnet:   Gabapentin 300 mg TID for anxiety   Zyprexa 10 mg BID HS for mood instability, parnoia, psychosis  PRN Zyprexa available      Medical:   No acute issues.  Reviewed admission labs. Potassium 3.3, low; calcium 8.3, low.  CBC with diff within normal limits except WBC 15.5, elevated; ANC 12.6, elevated, will monitor for signs of  infection,  COVID screen negative.  12/30 Kosher diet due to paranoia. Patient believes that people are trying to poison him.   1/3 DC'ed Kosher diet . He is able to eat unpackaged foods.      Behavioral/ psychology/ social:   Patient is not appropriate for groups due to his paranoia.   1:1 due due to impulsivity, paranoia, psychosis, and safety concerns.  Precautions: assault, suicide, cheeking  MA was approved.      Disposition:   Reason for continued hospitalization: Patient is paranoid , psychotic. He is unable to care for self. He is at high risk of harming others.   Stabilization with medications, provide structured and supportive environment, groups  Discharge on Friday, meds ordered.   Discharge plan- live with mother, MICD outpatient treatment.

## 2022-01-06 NOTE — PLAN OF CARE
Number of patients attending the group:  6  Group Length:  1 Hours    Group Therapy     Summary of Group / Topics Discussed:      The  Psychotherapy group goal is to promote insight to positive choice and change. Group processing is within a supportive and safe environment. Patients will process emotions using verbal group and expressive psychotherapy interventions.        Assessment: group was given a choice of topics, chose random questions. Discussed questions such as who was an impact on their life, what happens after death, what is a regret and what they are proud of, how they show kindness, and defining success. Group engaged in robust discussion.             Patient Response-Pt actively participated. Pt was friendly, expressed desire for sobriety, generally had somewhat concrete insight but did report being glad that he is here getting help.

## 2022-01-06 NOTE — PLAN OF CARE
Problem: Suicidal Behavior  Goal: Suicidal Behavior is Absent or Managed  Outcome: No Change    NURSING ASSESSMENT: Patient is assessed for suicidal risk and mental health symptoms. He is observed in the milieu interacting appropriately with staff and peers.  He attended and participated in group activities.    He denies SI, SIB, or HI thought, plan or intent and also denies hallucinations.  His affect is flat/blunted and mood is calm.  He rates depression at 8/10 (baseline per patient report) and anxiety at 2/10.    Patient reports he is excited about discharge tomorrow. His Mom is coming to pick him up and he tells writerhis plan is to return to his apartment, get a job and work, take his meds and stay away from drugs.    Patient's discharge meds are in the locked cabinet in the med room and a discharge order has been placed for tomorrow, 1/7/22.    He denies pain.  Vitals within expected limits and no concerns with intake and denies constipation.  Patient took medications and denies any known side effects.        Will continue with plan of care.      PRNS this shift None

## 2022-01-06 NOTE — DISCHARGE INSTRUCTIONS
Behavioral Discharge Planning and Instructions    Summary: You were admitted on 12/30/2021  due to Delusional Thoughts and Psychotic Symptomology.  You were treated by Debra Naegele APRN and discharged on 1/07/2022 from 4A to Home    Main Diagnosis:   1.  Psychosis, unspecified.  2.  Rule out substance-induced psychosis.  3.  Rule out schizophreniform disorder.  4.  Stimulant use disorder, severe.  5.  Cannabis use disorder, severe    Health Care Follow-up:     Psychiatry appointment: Monday, February 7 at 7:55 am via video (This is a 75 minute appointment. It is available in person if you prefer. Call the office to change it if you want to meet in person)  Follow up appointment:  Tuesday, March 8 at 9:10 am  Provider: [facility]  [address] [place] [address], [address]  [place] [id number]  Phone: [phone], Fax: [phone]    You have been referred to the [facility]  [facility] virtual program  Intake appointment: Tuesday, January 18 at 8:30 am (You will receive a phone call 15 minutes prior to the appointment to check-in. Please answer. It will be an 800 number. They will send a link via email)    Attend all scheduled appointments with your outpatient providers. Call at least 24 hours in advance if you need to reschedule an appointment to ensure continued access to your outpatient providers.     Major Treatments, Procedures and Findings:  You were provided with: a psychiatric assessment, assessed for medical stability, medication evaluation and/or management, group therapy, milieu management and medical interventions    Symptoms to Report: feeling more aggressive, increased confusion, losing more sleep, mood getting worse or thoughts of suicide    Early warning signs can include: increased depression or anxiety sleep disturbances increased thoughts or behaviors of suicide or self-harm  increased unusual thinking, such as paranoia or hearing voices    Safety and Wellness:  Take all  "medicines as directed.  Make no changes unless your doctor suggests them.   Follow treatment recommendations.  Refrain from alcohol and non-prescribed drugs.  If there is a concern for safety, call 911.    Resources:   Crisis Intervention: 242.862.6564 or 396-614-4154 (TTY: 457.162.3611).  Call anytime for help.  National Golden on Mental Illness (www.mn.hernan.org): 107.547.7241 or 919-580-3460.  Alcoholics Anonymous (www.alcoholics-anonymous.org): Check your phone book for your local chapter.  Suicide Awareness Voices of Education (SAVE) (www.save.org): 448-378-EEAS (8144)  National Suicide Prevention Line (www.mentalZikBit.org): 555-319-IPJZ (5372)  Self- Management and Recovery Training., SMART-- Toll free: 353.547.1876  www.[employer]  [facility] Crisis Response 309-615-9309  Text 4 Life: txt \"LIFE\" to 85524 for immediate support and crisis intervention    General Medication Instructions:   See your medication sheet(s) for instructions.   Take all medicines as directed.  Make no changes unless your doctor suggests them.   Go to all your doctor visits.  Be sure to have all your required lab tests. This way, your medicines can be refilled on time.  Do not use any drugs not prescribed by your doctor.  Avoid alcohol.    Advance Directives:   Scanned document on file with [employer]? No scanned doc  Is document scanned? Pt states no documents  Honoring Choices Your Rights Handout: Informed and given  Was more information offered? Pt declined    The Treatment team has appreciated the opportunity to work with you. If you have any questions or concerns about your recent admission, you can contact the unit which can receive your call 24 hours a day, 7 days a week. They will be able to get in touch with a Provider if needed. The unit number is [phone].      "

## 2022-01-06 NOTE — PLAN OF CARE
Problem: General Plan of Care (Inpatient Behavioral)  Goal: Team Discussion  Description: Team Plan:  Note: BEHAVIORAL TEAM DISCUSSION    Participants: Debra Naegele APRN, Maria Luisa DOCKERY, Franko Starr RN, Rachael Perdue OT  Progress: Improving  Anticipated length of stay: 1-2 days  Continued Stay Criteria/Rationale: Paranoia  Medical/Physical: No acute medical concerns  Precautions:   Behavioral Orders   Procedures    Assault precautions     Patient is paranoid.    Code 1 - Restrict to Unit    Routine Programming     As clinically indicated    Status 15     Every 15 minutes.    Suicide precautions     Patients on Suicide Precautions should have a Combination Diet ordered that includes a Diet selection(s) AND a Behavioral Tray selection for Safe Tray - with utensils, or Safe Tray - NO utensils       Plan: Psychiatric Assessment. Medication Management. Therapeutic Mileu. Individual and group support. Dual diagnosis outpatient program  Rationale for change in precautions or plan: SIO was discontinued due to less paranoia

## 2022-01-06 NOTE — PLAN OF CARE
Assessment/Intervention/Current Symtoms and Care Coordination    The patient's care was discussed with the treatment team and chart notes were reviewed.    Patient requested his insurance information which was provided. He expressed appreciation for help in getting him insurance.    Scheduled intake for dual diagnosis IOP program and completed the AVS     Discharge Plan or Goal  Home with outpatient support     Barriers to Discharge   Stabilization of paranoia     Referral Status  Patient was referred for MA application through [employer]. It is now active.     Legal Status    Voluntary

## 2022-01-07 LAB
C TRACH DNA SPEC QL NAA+PROBE: NEGATIVE
HCV AB SERPL QL IA: NONREACTIVE
HIV 1+2 AB+HIV1 P24 AG SERPL QL IA: NONREACTIVE
N GONORRHOEA DNA SPEC QL NAA+PROBE: NEGATIVE
T PALLIDUM AB SER QL: NONREACTIVE

## 2022-01-07 PROCEDURE — 99239 HOSP IP/OBS DSCHRG MGMT >30: CPT | Performed by: CLINICAL NURSE SPECIALIST

## 2022-01-07 PROCEDURE — 36415 COLL VENOUS BLD VENIPUNCTURE: CPT | Performed by: CLINICAL NURSE SPECIALIST

## 2022-01-07 PROCEDURE — 86803 HEPATITIS C AB TEST: CPT | Performed by: CLINICAL NURSE SPECIALIST

## 2022-01-07 PROCEDURE — 87389 HIV-1 AG W/HIV-1&-2 AB AG IA: CPT | Performed by: CLINICAL NURSE SPECIALIST

## 2022-01-07 PROCEDURE — 250N000013 HC RX MED GY IP 250 OP 250 PS 637: Performed by: CLINICAL NURSE SPECIALIST

## 2022-01-07 PROCEDURE — 86780 TREPONEMA PALLIDUM: CPT | Performed by: CLINICAL NURSE SPECIALIST

## 2022-01-07 RX ADMIN — OLANZAPINE 10 MG: 10 TABLET, FILM COATED ORAL at 07:50

## 2022-01-07 RX ADMIN — NICOTINE POLACRILEX 4 MG: 2 GUM, CHEWING BUCCAL at 07:50

## 2022-01-07 RX ADMIN — GABAPENTIN 300 MG: 300 CAPSULE ORAL at 07:50

## 2022-01-07 NOTE — PLAN OF CARE
Assessment/Intervention/Current Symtoms and Care Coordination    The patient's care was discussed with the treatment team and chart notes were reviewed.    Patient discharges today. AVS is complete. His mother will pick him up.     Discharge Plan or Goal  Home with outpatient support     Barriers to Discharge   None     Referral Status  Patient was referred for MA application through [employer]. It is now active. Patient was referred to [facility] and an intake was scheduled.     Legal Status    Voluntary

## 2022-01-07 NOTE — PLAN OF CARE
Focus: Shift summary    Data: Patient slept 6 hours last night. Woke up at 0500 this AM and had 1x snack; No other interventions needed. Respirations even and unlabored on status 15 checks. Will continue to monitor and report to oncoming staff.    Response: Report sleep hours to day shift. Continue to monitor patient and provide therapeutic interventions as necessary.

## 2022-01-07 NOTE — PLAN OF CARE
DISCHARGE:  This RN and pt have reviewed all meds and aftercare plan. All belongings returned. Pt denies SI , anxiety or depression at this time. Pt bright and smiling upon DC.  His mother & father to pick pt up at 1030.

## 2022-01-11 NOTE — DISCHARGE SUMMARY
"Psychiatric Discharge Summary    Juan Carlos Edouard MRN# [id number]   Age: 21 year old YOB: 2000     Date of Admission:  12/30/2021  Date of Discharge:  1/7/2022 11:33 AM  Admitting Physician:  Bret Feliciano MD  Discharge Physician:  Debra A. Naegele, APRN CNS (Contact: [phone])         Event Leading to Hospitalization:   Juan Carlos Edouard is a 21-year-old  single male presenting with a history of substance abuse including methamphetamine and cannabis, psychosis and aggression.  The patient is very paranoid.  He is currently on a 1:1 due to his agitation.  The patient presented to the hospital because he ran away without shirt or shoes and arrived at a local gas station where he was paranoid and erratic, thinking that people were chasing him.  The patient was placed in restraints while in the emergency room.  The patient thought the FBI was after him.  The patient stated \"get the FBI here now.\"  The patient also believes that the police are following him.  The patient stated that \"Mexicans who uses crystal meth are after me.\"  The patient states, \"The meth heads are bothering me.\"  The patient states this has been going on for the last 5 years.  The patient reports that a relative of his boss is targeting him.  The patient states everyone at work is targeting him.  The patient states he does not know why they are being so mean to him, he just wants to work.  The patient also believes that the meth heads are throwing rocks at his windows.     When provider interviewed the patient, he was hypervigilant.  The patient startled when he heard the vacuum  in the hallway.  The patient needed to have the door open to the interview room.  He was very paranoid.  The patient wanted to know what I was, what my credentials are.  He asked if I had any relationship to the police.  He also asked if knew any . Provider stated that I do not have a relationship with any  or police, and " that he was safe.  The patient appeared to calm somewhat. The patient was given 5 mg of Zyprexa ODT, which he spit out onto the floor.     Goals for this hospitalization is stabilization with medications and finding resources for patient.       See Admission note by Naegele, Debra Ann, APRN CNS found on 12/30/2021  for additional details.          DIagnoses:   1.  Psychosis, unspecified.  2.  Rule out substance-induced psychosis.  3.  Rule out schizophreniform disorder.  4.  Stimulant use disorder, severe.  5.  Cannabis use disorder, severe.          Labs:     Results for orders placed or performed during the hospital encounter of 12/30/21   Treponema Abs w Reflex to RPR and Titer     Status: Normal   Result Value Ref Range    Treponema Antibody Total Nonreactive Nonreactive   HIV Antigen Antibody Combo     Status: Normal   Result Value Ref Range    HIV Antigen Antibody Combo Nonreactive Nonreactive   Hepatitis C antibody     Status: Normal   Result Value Ref Range    Hepatitis C Antibody Nonreactive Nonreactive    Narrative    Assay performance characteristics have not been established for newborns, infants, and children.   Neisseria gonorrhoea PCR     Status: Normal    Specimen: Urine, Voided   Result Value Ref Range    Neisseria gonorrhoeae Negative Negative   Chlamydia trachomatis PCR     Status: Normal    Specimen: Urine, Voided   Result Value Ref Range    Chlamydia trachomatis Negative Negative            Consults:   No consultations were requested during this admission         Hospital Course:   Juan Carlos Edouard was admitted to [facility] with attending Behzad Schmitz MD found on a 72 hour mental health hold, but patient subsequently signed in voluntarily.  MICD commitment was pursued but not supported by the [facility]. The patient was placed under status 15 (15 minute checks) to ensure patient safety.     Patient was started on Zyprexa 10 mg and titrated to 10 mg BID due to psychosis and paranoia. Patient  had multiple panic attacks which subsided with Zyprexa. Patient believed that people were out to kill him. Patient was started on gabapentin 300 mg TID to address anxiety. He also used hydroxyzine for anxiety. Discussed risks, benefits and side effects of medications with patient.     Patient was educated on the detrimental effects of cannabis and methamphetamine on his mood and thinking process. Provider recommended to abstain form all illicit substance use.       Medical:   No acute issues.  Reviewed admission labs. Potassium 3.3, low; calcium 8.3, low.  CBC with diff within normal limits except WBC 15.5, elevated; ANC 12.6, elevated, will monitor for signs of infection,  COVID screen negative. Patient requested STD testing which came back negative and non-reactive.     Juan Carlos Edouard did participate in groups and was visible in the milieu.     The patient's symptoms of psychosis and suicidal ideaiton improved. Patient reports improved mood and deneis suicidal ideation. Patient hs protective factors of seeking out treatment and supportive mother.    Juan Carlos Edouard was released to home into the care of his mother. . At the time of discharge Juan Carlos Edouard was determined to not be a danger to himself or others.          Discharge Medications:     Discharge Medication List as of 1/7/2022  9:45 AM      START taking these medications    Details   gabapentin (NEURONTIN) 300 MG capsule Take 1 capsule (300 mg) by mouth 3 times daily, Disp-90 capsule, R-1, E-Prescribe      hydrOXYzine (ATARAX) 25 MG tablet Take 1-2 tablets (25-50 mg) by mouth every 4 hours as needed for anxiety, Disp-120 tablet, R-1, E-Prescribe      OLANZapine (ZYPREXA) 10 MG tablet Take 1 tablet (10 mg) by mouth 2 times daily, Disp-60 tablet, R-1, E-Prescribe                  Psychiatric Examination:   Appearance:  awake, alert and adequately groomed  Attitude:  cooperative  Eye Contact:  good  Mood:  better  Affect:  appropriate and in normal  range  Speech:  normal prosody  Psychomotor Behavior:  no evidence of tardive dyskinesia, dystonia, or tics  Thought Process:  goal oriented  Associations:  no loose associations, lingering paranoia about work   Thought Content:  no evidence of suicidal ideation or homicidal ideation, no auditory hallucinations present and no visual hallucinations present  Insight:  fair  Judgment:  limited  Oriented to:  time, person, and place  Attention Span and Concentration:  fair  Recent and Remote Memory:  intact  Language: Able to name objects, Able to repeat phrases and Able to read and write  Fund of Knowledge: appropriate  Muscle Strength and Tone: normal  Gait and Station: Normal         Discharge Plan:       Further instructions from your care team       Behavioral Discharge Planning and Instructions    Summary: You were admitted on 12/30/2021  due to Delusional Thoughts and Psychotic Symptomology.  You were treated by Debra Naegele APRN and discharged on 1/07/2022 from 4A to Home    Main Diagnosis:   1.  Psychosis, unspecified.  2.  Rule out substance-induced psychosis.  3.  Rule out schizophreniform disorder.  4.  Stimulant use disorder, severe.  5.  Cannabis use disorder, severe    Health Care Follow-up:     Psychiatry appointment: Monday, February 7 at 7:55 am via video (This is a 75 minute appointment. It is available in person if you prefer. Call the office to change it if you want to meet in person)  Follow up appointment:  Tuesday, March 8 at 9:10 am  Provider: [facility]  [address] [place] [address], [address]  [place] [address]  Phone: [phone], Fax: [phone]    You have been referred to the [facility] virtual program  Intake appointment: Tuesday, January 18 at 8:30 am (You will receive a phone call 15 minutes prior to the appointment to check-in. Please answer. It will be an 800 number. They will send a link via email)    Attend all scheduled appointments with your  "outpatient providers. Call at least 24 hours in advance if you need to reschedule an appointment to ensure continued access to your outpatient providers.     Major Treatments, Procedures and Findings:  You were provided with: a psychiatric assessment, assessed for medical stability, medication evaluation and/or management, group therapy, milieu management and medical interventions    Symptoms to Report: feeling more aggressive, increased confusion, losing more sleep, mood getting worse or thoughts of suicide    Early warning signs can include: increased depression or anxiety sleep disturbances increased thoughts or behaviors of suicide or self-harm  increased unusual thinking, such as paranoia or hearing voices    Safety and Wellness:  Take all medicines as directed.  Make no changes unless your doctor suggests them.   Follow treatment recommendations.  Refrain from alcohol and non-prescribed drugs.  If there is a concern for safety, call 911.    Resources:   Crisis Intervention: 261.195.8883 or 745-272-2519 (TTY: 577.517.5071).  Call anytime for help.  National [place] on Mental Illness (www.mn.hernan.org): 310.628.8815 or 186-266-4853.  Alcoholics Anonymous (www.alcoholics-anonymous.org): Check your phone book for your local chapter.  Suicide Awareness Voices of Education (SAVE) (www.save.org): 883-753-NKCC (8380)  National Suicide Prevention Line (www.mentalhealthmn.org): 439-872-MIQG (4602)  Self- Management and Recovery Training., SMART-- Toll free: 132.714.3950  www.[employer].ClickHome  [facility] Crisis Response 943-470-8434  Text 4 Life: txt \"LIFE\" to 89727 for immediate support and crisis intervention    General Medication Instructions:   See your medication sheet(s) for instructions.   Take all medicines as directed.  Make no changes unless your doctor suggests them.   Go to all your doctor visits.  Be sure to have all your required lab tests. This way, your medicines can be refilled on time.  Do not use " any drugs not prescribed by your doctor.  Avoid alcohol.    Advance Directives:   Scanned document on file with [employer]? No scanned doc  Is document scanned? Pt states no documents  Honoring Choices Your Rights Handout: Informed and given  Was more information offered? Pt declined    The Treatment team has appreciated the opportunity to work with you. If you have any questions or concerns about your recent admission, you can contact the unit which can receive your call 24 hours a day, 7 days a week. They will be able to get in touch with a Provider if needed. The unit number is [phone].            Attestation:  The patient has been seen and evaluated by me,  Debra A. Naegele, APOLINAR CNS on 1/7/2022  Discharge summary time > 30 minutes

## 2022-01-14 ENCOUNTER — TELEPHONE (OUTPATIENT)
Dept: BEHAVIORAL HEALTH | Facility: CLINIC | Age: 22
End: 2022-01-14
Payer: MEDICAID

## 2022-01-18 ENCOUNTER — TELEPHONE (OUTPATIENT)
Dept: BEHAVIORAL HEALTH | Facility: CLINIC | Age: 22
End: 2022-01-18

## 2022-01-18 NOTE — TELEPHONE ENCOUNTER
Patient have a virtual video appointment today with [facility] at 9am. Writer placed a call this morning to check in patient, but did not get a hold of patient. Patient's mobile line rings for 2 minutes, writer could not leave a voicemail behind. Writer will try again.

## 2022-01-18 NOTE — TELEPHONE ENCOUNTER
Writer placed a second call and did not get a hold of patient. There was no voicemail box, writer could not leave a voicemail behind. Writer tried patient's home phone, but the phone number is not in service. Writer will inform patient's provider.

## 2022-03-01 ENCOUNTER — HOSPITAL ENCOUNTER (EMERGENCY)
Facility: CLINIC | Age: 22
Discharge: HOME | End: 2022-03-01
Attending: EMERGENCY MEDICINE | Admitting: EMERGENCY MEDICINE
Payer: COMMERCIAL

## 2022-03-01 VITALS
OXYGEN SATURATION: 100 % | SYSTOLIC BLOOD PRESSURE: 106 MMHG | TEMPERATURE: 98.8 F | DIASTOLIC BLOOD PRESSURE: 68 MMHG | HEART RATE: 72 BPM | RESPIRATION RATE: 14 BRPM

## 2022-03-01 DIAGNOSIS — F43.20 EMOTIONAL CRISIS: ICD-10-CM

## 2022-03-01 PROCEDURE — 90791 PSYCH DIAGNOSTIC EVALUATION: CPT

## 2022-03-01 PROCEDURE — 250N000013 HC RX MED GY IP 250 OP 250 PS 637: Performed by: EMERGENCY MEDICINE

## 2022-03-01 PROCEDURE — 99285 EMERGENCY DEPT VISIT HI MDM: CPT | Mod: 25

## 2022-03-01 RX ORDER — OLANZAPINE 10 MG/1
10 TABLET ORAL
Status: COMPLETED | OUTPATIENT
Start: 2022-03-01 | End: 2022-03-01

## 2022-03-01 RX ADMIN — OLANZAPINE 10 MG: 10 TABLET, FILM COATED ORAL at 18:11

## 2022-03-01 ASSESSMENT — ENCOUNTER SYMPTOMS
FEVER: 0
COUGH: 0
SHORTNESS OF BREATH: 0
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
## 2022-03-02 NOTE — DISCHARGE INSTRUCTIONS
Aftercare Plan  Upcoming Appointment:  Monday, 3/7/2022 @ 3:00 pm   Therapy - Initial (In-Person)    Maxwell Hernandez EdD,MS  [address] [address]      If I am feeling unsafe or I am in a crisis, I will:   Contact my established care providers   Call the National Suicide Prevention Lifeline: 753.325.3644   Go to the nearest emergency room   Call 911     Warning signs that I or other people might notice when a crisis is developing for me:     I am having increasing suicidal thoughts that turn to plans with intent or means  I am having additional urges to self-harm    My emotions are of hopelessness; feeling like there's no way out.  Rage or anger.  Engaging in risky activities without thinking  Withdrawing from family/friends  Dramatic mood swings  Drastic personality changes   Use of alcohol or drugs  Postings on social media  Neglect of personal hygiene or cares     Things I am able to do on my own to cope or help me feel better:    Spending quality time with loved ones  Staying hydrated  Eating balanced meals  Going for a walk every day  Take care of daily responsibilities/needs  Focus on positive self-talk vs negative self-talk    Things that I am able to do with others to cope or help me better:   Exercise  Music  Deep breathing  Meditations  Journal  Self-regulate  Self check-in  Ask for help    Things I can use or do for distraction:   Reach out to/spend time with family, friends  Shower  Exercise  Chores or do a project  Listen to music  Watch movie/TV  Listening to music  Journaling  Reading a book  Meditating  Call a friend    Changes I can make to support my mental health and wellness:    -I will abstain from all mood altering chemicals not currently prescribed to me   -I will attend scheduled mental health therapy and psychiatric appointments and follow all   recommendations  -I will commit to 30 minutes of self care daily - this can be as simple as taking a  shower, going for a   walk, cooking a meal, read, writing, etc  -I will practice square breathing when I begin to feel anxious - in breath through the nose for the count   of 4 and the first line on the square. Out breath through the mouth for the count of 4 for the second line   of the square. Repeat to complete the square. Repeat the square as many times as needed.  - I will use distraction skills of: going for walks, watching TV, spending time outside, calling a friend or   family member  -Use community resources, including hotline numbers, [facility] crisis and support meetings  -Maintain a daily schedule/routine  -Practice deep breathing skills  -Download a meditation freda and spend 15-20 minutes per day mediating/relaxing. Some apps to   download include: Calm, Headspace and Insight Timer. All 3 of these apps have free version    People in my life that I can ask for help:   Family  Friends      Your [facility] has a mental health crisis team you can call 24/7:   [facility] Crisis Line Number: [phone]      Other things that are important when I'm in crisis:   Ask for help    Additional resources and information:     Mental Health Apps  My3  [email or web address]    VirtualHopeBox  [email or web address]/virtual-hope-[email or web address]       Professionals or Agencies I Can Contact During A Crisis:       Crisis Lines  Crisis Text Line  Text 035215  You will be connected with a trained live crisis counselor to provide support.    The Drew Project (LGBTQ Youth Crisis Line)  9.992.004.1851  text START to 698-118    National [place] on Mental Illness (YUDY)  314.382.0323 or 0.888.YUDY.HELPS    National Suicide Prevention Lifeline at 3-200-361-MHWG (5348)     Throughout  [place]: call **CRISIS (**030240)     Crisis Text Line: is available for free, 24/7 by texting [place] to 417191    Community [place]  Fast Tracker  Linking people to mental health and substance use disorder resources  Plandayn.org  "    [facility] Warm Line  Peer to peer support  Monday thru Saturday, 12 pm to 10 pm  811.197.8761 or 4.759.301.8316  Text \"Support\" to 84372     National [place] on Mental Illness (www.[place].hernan.org): 661.229.9930 or 748-048-9114     [facility] Phone (free remote counseling): 129.702.3302 Web address:   [email or web address]/     [employer] (filter for insurance, gender preference, etc.)    [facility]  [phone]  *offers individual therapy, medication management and Mental Health Case Workers; can self refer    Please follow up with scheduled providers to ensure all necessary paperwork is filled out prior to your   scheduled telehealth appointments.     Coordinators from [facility] will be calling within two business days to ensure   that you have the resources you may need or provide assistance with scheduling (Phone number: [phone].).    Remember: give the referrals 3 sessions prior to calling it quits. Do you trust them? Do you feel   understood? Do you think they can help? Check in with yourself after each session    Someone will follow up with you as soon as possible for a Transition Clinic Referral. This referral will   allow you to touch base with a therapist, through telehealth for several sessions before you are able to see a provider.    Please reach out to the [facility]([phone]) regarding further mental health appointment needs for this emergency department visit.    "

## 2022-03-03 ENCOUNTER — TELEPHONE (OUTPATIENT)
Dept: BEHAVIORAL HEALTH | Facility: CLINIC | Age: 22
End: 2022-03-03
Payer: COMMERCIAL

## 2022-03-03 NOTE — TELEPHONE ENCOUNTER
First attempt at reaching patient ([phone]). Left message asking for a return call to schedule with the TC.    Loida Vines  Transition Clinic Coordinator      ----- Message from LEIGH ANN Belcher sent at 3/1/2022  9:39 PM CST -----  Regarding: interim therapy for pt  Transition Clinic Referral   [place] Only   Limited [place] Availability    Type of Referral:      _____Therapy    ___X__Therapy & Medication (Psychiatry next level of care appointment needs to be scheduled)  _____Medication Only (Psychiatry next level of care appointment needs to be scheduled)  _____Diagnostic Assessment Only      Referring Provider Name: LEIGH ANN Belcher    Clinician completing the assessment. LEIGH ANN Belcher    Referring Provider: [facility] PROVIDER    If known, referring provider contact name: Dr. Patricia; Phone Number: n/a    Reason for Transition Clinic Referral: pt next therapy appt is next week and pt needs assistance in making medication management appointment with a new provider    Next Level of Care Patient Will Be Transitioned To: outpatient    Start Date for Next Level of Care Therapy (Required): Monday, 3/7 at 3pm  Provider: Maxwell Hernandez EdD,MS  [facility]   Location: [facility]     What Would Be Helpful from the Transition Clinic: pt next therapy appt is next week and pt needs assistance in making medication management appointment with a new provider     Needs: NO    Does Patient Have Access to Technology: yes    Patient E-mail Address: [email or web address]    Current Patient Phone Number: [phone], [phone]    Clinician Gender Preference (if applicable): NO    LEIGH ANN Belcher

## 2022-03-04 ENCOUNTER — TELEPHONE (OUTPATIENT)
Dept: BEHAVIORAL HEALTH | Facility: CLINIC | Age: 22
End: 2022-03-04
Payer: COMMERCIAL

## 2022-03-04 NOTE — TELEPHONE ENCOUNTER
2nd attempt to contact pt. Tried all three numbers as two are disconnected. Writer left VM with TC contact info and encouraged a call back to schedule appointments.    Michelle Rehman  03/04/22  906    ----- Message from LEIGH ANN Belcher sent at 3/1/2022  9:39 PM CST -----  Regarding: interim therapy for pt  Transition Clinic Referral   [place] Only   Limited [place] Availability    Type of Referral:      _____Therapy    ___X__Therapy & Medication (Psychiatry next level of care appointment needs to be scheduled)  _____Medication Only (Psychiatry next level of care appointment needs to be scheduled)  _____Diagnostic Assessment Only      Referring Provider Name: LEIGH ANN Belcher    Clinician completing the assessment. LEIGH ANN Belcher    Referring Provider: [facility] PROVIDER    If known, referring provider contact name: Dr. Patricia; Phone Number: n/a    Reason for Transition Clinic Referral: pt next therapy appt is next week and pt needs assistance in making medication management appointment with a new provider    Next Level of Care Patient Will Be Transitioned To: outpatient    Start Date for Next Level of Care Therapy (Required): Monday, 3/7 at 3pm  Provider: Maxwell Hernandez EdDMS  [facility]   Location: [facility]     What Would Be Helpful from the Transition Clinic: pt next therapy appt is next week and pt needs assistance in making medication management appointment with a new provider     Needs: NO    Does Patient Have Access to Technology: yes    Patient E-mail Address: [email or web address]    Current Patient Phone Number: [phone], [phone]    Clinician Gender Preference (if applicable): NO    LEIGH ANN Belcher

## 2022-03-05 ENCOUNTER — TELEPHONE (OUTPATIENT)
Dept: BEHAVIORAL HEALTH | Facility: CLINIC | Age: 22
End: 2022-03-05
Payer: COMMERCIAL

## 2022-03-05 NOTE — TELEPHONE ENCOUNTER
3rd attempt to contact pt. Tried all three numbers as two are disconnected. Writer left VM with TC contact info and encouraged a call back to schedule appointments Referral marked as closed.     Michelle Rehman  03/05/22  935     ----- Message from LEIGH ANN Belcher sent at 3/1/2022  9:39 PM CST -----  Regarding: interim therapy for pt  Transition Clinic Referral   [place] Only   Limited [place] Availability    Type of Referral:      _____Therapy    ___X__Therapy & Medication (Psychiatry next level of care appointment needs to be scheduled)  _____Medication Only (Psychiatry next level of care appointment needs to be scheduled)  _____Diagnostic Assessment Only      Referring Provider Name: LEIGH ANN Belcher    Clinician completing the assessment. LEIGH ANN Belcher    Referring Provider: [facility] PROVIDER    If known, referring provider contact name: Dr. Patricia; Phone Number: n/a    Reason for Transition Clinic Referral: pt next therapy appt is next week and pt needs assistance in making medication management appointment with a new provider    Next Level of Care Patient Will Be Transitioned To: outpatient    Start Date for Next Level of Care Therapy (Required): Monday, 3/7 at 3pm  Provider: Maxwell Hernandez EdDMS  [facility]   Location: [facility]     What Would Be Helpful from the Transition Clinic: pt next therapy appt is next week and pt needs assistance in making medication management appointment with a new provider     Needs: NO    Does Patient Have Access to Technology: yes    Patient E-mail Address: [email or web address]    Current Patient Phone Number: [phone], [phone]    Clinician Gender Preference (if applicable): NO    LEIGH ANN Belcher

## 2022-03-31 ENCOUNTER — TELEPHONE (OUTPATIENT)
Dept: NURSING | Facility: CLINIC | Age: 22
End: 2022-03-31
Payer: COMMERCIAL

## 2022-03-31 NOTE — TELEPHONE ENCOUNTER
Pt called about test results for STD. Notified that results stated negative.     Thanks,  FRANCOIS Murrell  [facility]

## 2023-03-16 ENCOUNTER — HOSPITAL ENCOUNTER (EMERGENCY)
Facility: CLINIC | Age: 23
Discharge: HOME | End: 2023-03-16
Attending: EMERGENCY MEDICINE | Admitting: EMERGENCY MEDICINE
Payer: COMMERCIAL

## 2023-03-16 VITALS
SYSTOLIC BLOOD PRESSURE: 122 MMHG | DIASTOLIC BLOOD PRESSURE: 69 MMHG | OXYGEN SATURATION: 97 % | TEMPERATURE: 98.6 F | HEART RATE: 86 BPM | RESPIRATION RATE: 20 BRPM

## 2023-03-16 DIAGNOSIS — Z59.00 HOMELESSNESS: ICD-10-CM

## 2023-03-16 DIAGNOSIS — T69.9XXA COLD EXPOSURE, INITIAL ENCOUNTER: ICD-10-CM

## 2023-03-16 PROCEDURE — 99283 EMERGENCY DEPT VISIT LOW MDM: CPT

## 2023-03-16 SDOH — ECONOMIC STABILITY - HOUSING INSECURITY: HOMELESSNESS UNSPECIFIED: Z59.00

## 2023-03-16 ASSESSMENT — ACTIVITIES OF DAILY LIVING (ADL): ADLS_ACUITY_SCORE: 35

## 2023-03-16 NOTE — ED PROVIDER NOTES
History   Chief Complaint:  Cold Exposure    The history is provided by the patient.      Juan Carlos Edouard is a 22 year old male who presents with cold exposure. Juan Carlos explains that he slept in a heated garage last night but had cold fingers and toes. His coldness is presently improved. Denies history of frost bite. He states that his mother lives around here but is not currently around. He does not have plan for a place to stay tonight.     Independent Historian:   None - Patient Only    Review of External Notes:   None     ROS:  Review of Systems    Allergies:  Penicillins     Medications:    Gabapentin   Hydroxyzine   Zyprexa   Patient denies use of prescription medications.    Past Medical History:    Schizophrenia   Depression     Past Surgical History:    Bilateral Myringotomy   Tonsillectomy     Social History:  Presents to the emergency department alone.   PCP: No active PCP.     Physical Exam     Patient Vitals for the past 24 hrs:   BP Temp Temp src Pulse Resp SpO2   03/16/23 0928 122/69 98.6  F (37  C) Temporal 86 20 97 %     Physical Exam  Constitutional: Vital signs reviewed as above.   Head: No external signs of trauma noted.  Eyes: Pupils are equal, round, and reactive to light.   Neck: No JVD noted  Cardiovascular: normal rate, Regular rhythm and normal heart sounds.  No murmur heard. Equal B/L peripheral pulses.  Pulmonary/Chest: Effort normal and breath sounds normal. No respiratory distress. Patient has no wheezes. Patient has no rales.   Gastrointestinal: Soft. There is no tenderness.   Musculoskeletal/Extremities: No pitting edema noted. Normal tone.  Neurological: Patient is alert and oriented to person, place, and time.   Skin: Skin is warm and dry. There is no diaphoresis noted. No sequelae of frostbite are noted on the hands or feet.  Psychiatric: The patient appears calm.        Emergency Department Course     Emergency Department Course & Assessments:    Assessments/Consults/Independent  Interpretation:  ED Course as of 03/16/23 1112   Thu Mar 16, 2023   1041 Dr. Munoz' evaluation. Discussed findings and discharge. All questions answered.      Social Determinants of Health affecting care:   Homelessness/Housing Insecurity    Disposition:  The patient was discharged to home.     Impression & Plan      Medical Decision Making:  This 22-year-old male patient presents to the ED due to concerns for cold exposure.  Please see the HPI and exam for specifics.  By the time of my evaluation, the patient was sleeping comfortably.  He states he feels fine now, denies pain, and denies any blistering or frostbite injury to his hands or feet.  It does not sound like he has family in the area.  We will provide him with shelter resources.  There were no other emergent medical conditions identified so the patient was discharged.      Diagnosis:    ICD-10-CM    1. Cold exposure, initial encounter  T69.9XXA       2. Homelessness  Z59.00         Scribe Disclosure:  Tabby MOSHER, am serving as a scribe at 10:07 AM on 3/16/2023 to document services personally performed by Korey Munoz DO based on my observations and the provider's statements to me.    3/16/2023   Korey Munoz DO Burns, Bradley Joseph, DO  03/16/23 5128

## 2023-03-16 NOTE — ED TRIAGE NOTES
Patient states he got really cold last night. Patient stayed in a heated garage in an apartment complex. Denies frostbite in triage

## 2023-03-16 NOTE — DISCHARGE INSTRUCTIONS
What do you do next:   Review the shelter resources we have provided.  Follow up as indicated below    When do you return: If you have recurrent cold exposure, loss of sensation to your extremities, blue/purple discoloration or blistering, or any other symptoms that concern you, please return to the ED for reevaluation.    Thank you for allowing us to care for you today.

## 2023-03-16 NOTE — ED NOTES
Pt given shelter resources prior to discharge. Pt has working cell phone and cell phone  to make phone calls. Pt is alert and oriented and verbalized understanding.

## 2023-06-15 ENCOUNTER — HOSPITAL ENCOUNTER (EMERGENCY)
Facility: CLINIC | Age: 23
Discharge: OTHER INSTITUTION | End: 2023-06-17
Attending: EMERGENCY MEDICINE | Admitting: EMERGENCY MEDICINE
Payer: COMMERCIAL

## 2023-06-15 DIAGNOSIS — F20.9 SCHIZOPHRENIA, UNSPECIFIED TYPE (H): ICD-10-CM

## 2023-06-15 DIAGNOSIS — F22 PARANOIA (H): ICD-10-CM

## 2023-06-15 LAB
ANION GAP SERPL CALCULATED.3IONS-SCNC: 14 MMOL/L (ref 7–15)
BASOPHILS # BLD AUTO: 0.1 10E3/UL (ref 0–0.2)
BASOPHILS NFR BLD AUTO: 0 %
BUN SERPL-MCNC: 11.8 MG/DL (ref 6–20)
CALCIUM SERPL-MCNC: 9.5 MG/DL (ref 8.6–10)
CHLORIDE SERPL-SCNC: 97 MMOL/L (ref 98–107)
CK SERPL-CCNC: 580 U/L (ref 39–308)
CREAT SERPL-MCNC: 1.31 MG/DL (ref 0.67–1.17)
DEPRECATED HCO3 PLAS-SCNC: 23 MMOL/L (ref 22–29)
EOSINOPHIL # BLD AUTO: 0.1 10E3/UL (ref 0–0.7)
EOSINOPHIL NFR BLD AUTO: 1 %
ERYTHROCYTE [DISTWIDTH] IN BLOOD BY AUTOMATED COUNT: 12.8 % (ref 10–15)
ETHANOL SERPL-MCNC: <0.01 G/DL
GFR SERPL CREATININE-BSD FRML MDRD: 78 ML/MIN/1.73M2
GLUCOSE SERPL-MCNC: 99 MG/DL (ref 70–99)
HCT VFR BLD AUTO: 46.2 % (ref 40–53)
HGB BLD-MCNC: 16.2 G/DL (ref 13.3–17.7)
IMM GRANULOCYTES # BLD: 0.1 10E3/UL
IMM GRANULOCYTES NFR BLD: 1 %
LYMPHOCYTES # BLD AUTO: 1.4 10E3/UL (ref 0.8–5.3)
LYMPHOCYTES NFR BLD AUTO: 9 %
MCH RBC QN AUTO: 30.7 PG (ref 26.5–33)
MCHC RBC AUTO-ENTMCNC: 35.1 G/DL (ref 31.5–36.5)
MCV RBC AUTO: 88 FL (ref 78–100)
MONOCYTES # BLD AUTO: 1.1 10E3/UL (ref 0–1.3)
MONOCYTES NFR BLD AUTO: 7 %
NEUTROPHILS # BLD AUTO: 12.8 10E3/UL (ref 1.6–8.3)
NEUTROPHILS NFR BLD AUTO: 82 %
NRBC # BLD AUTO: 0 10E3/UL
NRBC BLD AUTO-RTO: 0 /100
PLATELET # BLD AUTO: 325 10E3/UL (ref 150–450)
POTASSIUM SERPL-SCNC: 3.9 MMOL/L (ref 3.4–5.3)
RBC # BLD AUTO: 5.27 10E6/UL (ref 4.4–5.9)
SODIUM SERPL-SCNC: 134 MMOL/L (ref 136–145)
WBC # BLD AUTO: 15.5 10E3/UL (ref 4–11)

## 2023-06-15 PROCEDURE — 94640 AIRWAY INHALATION TREATMENT: CPT

## 2023-06-15 PROCEDURE — 93005 ELECTROCARDIOGRAM TRACING: CPT

## 2023-06-15 PROCEDURE — 82077 ASSAY SPEC XCP UR&BREATH IA: CPT | Performed by: EMERGENCY MEDICINE

## 2023-06-15 PROCEDURE — 99285 EMERGENCY DEPT VISIT HI MDM: CPT | Mod: 25

## 2023-06-15 PROCEDURE — 85025 COMPLETE CBC W/AUTO DIFF WBC: CPT | Performed by: EMERGENCY MEDICINE

## 2023-06-15 PROCEDURE — 36415 COLL VENOUS BLD VENIPUNCTURE: CPT | Performed by: EMERGENCY MEDICINE

## 2023-06-15 PROCEDURE — 250N000013 HC RX MED GY IP 250 OP 250 PS 637: Performed by: EMERGENCY MEDICINE

## 2023-06-15 PROCEDURE — 82550 ASSAY OF CK (CPK): CPT | Performed by: EMERGENCY MEDICINE

## 2023-06-15 PROCEDURE — 82310 ASSAY OF CALCIUM: CPT | Performed by: EMERGENCY MEDICINE

## 2023-06-15 PROCEDURE — 90791 PSYCH DIAGNOSTIC EVALUATION: CPT

## 2023-06-15 PROCEDURE — 96360 HYDRATION IV INFUSION INIT: CPT

## 2023-06-15 PROCEDURE — 96361 HYDRATE IV INFUSION ADD-ON: CPT

## 2023-06-15 RX ORDER — OLANZAPINE 5 MG/1
5 TABLET, ORALLY DISINTEGRATING ORAL ONCE
Status: COMPLETED | OUTPATIENT
Start: 2023-06-15 | End: 2023-06-15

## 2023-06-15 RX ORDER — ALBUTEROL SULFATE 90 UG/1
2 AEROSOL, METERED RESPIRATORY (INHALATION) ONCE
Status: COMPLETED | OUTPATIENT
Start: 2023-06-15 | End: 2023-06-15

## 2023-06-15 RX ADMIN — OLANZAPINE 5 MG: 5 TABLET, ORALLY DISINTEGRATING ORAL at 23:27

## 2023-06-15 RX ADMIN — ALBUTEROL SULFATE 2 PUFF: 90 AEROSOL, METERED RESPIRATORY (INHALATION) at 23:26

## 2023-06-15 ASSESSMENT — COLUMBIA-SUICIDE SEVERITY RATING SCALE - C-SSRS
2. HAVE YOU ACTUALLY HAD ANY THOUGHTS OF KILLING YOURSELF?: NO
REASONS FOR IDEATION PAST MONTH: COMPLETELY TO END OR STOP THE PAIN (YOU COULDN'T GO ON LIVING WITH THE PAIN OR HOW YOU WERE FEELING)
2. HAVE YOU ACTUALLY HAD ANY THOUGHTS OF KILLING YOURSELF?: YES
1. HAVE YOU WISHED YOU WERE DEAD OR WISHED YOU COULD GO TO SLEEP AND NOT WAKE UP?: YES
5. HAVE YOU STARTED TO WORK OUT OR WORKED OUT THE DETAILS OF HOW TO KILL YOURSELF? DO YOU INTEND TO CARRY OUT THIS PLAN?: NO
6. HAVE YOU EVER DONE ANYTHING, STARTED TO DO ANYTHING, OR PREPARED TO DO ANYTHING TO END YOUR LIFE?: NO
TOTAL  NUMBER OF ABORTED OR SELF INTERRUPTED ATTEMPTS LIFETIME: NO
4. HAVE YOU HAD THESE THOUGHTS AND HAD SOME INTENTION OF ACTING ON THEM?: NO
3. HAVE YOU BEEN THINKING ABOUT HOW YOU MIGHT KILL YOURSELF?: YES
REASONS FOR IDEATION LIFETIME: EQUALLY TO GET ATTENTION, REVENGE, OR A REACTION FROM OTHERS AND TO END/STOP THE PAIN
ATTEMPT LIFETIME: NO
1. IN THE PAST MONTH, HAVE YOU WISHED YOU WERE DEAD OR WISHED YOU COULD GO TO SLEEP AND NOT WAKE UP?: NO
TOTAL  NUMBER OF INTERRUPTED ATTEMPTS LIFETIME: NO

## 2023-06-15 ASSESSMENT — ACTIVITIES OF DAILY LIVING (ADL): ADLS_ACUITY_SCORE: 37

## 2023-06-16 ENCOUNTER — TELEPHONE (OUTPATIENT)
Dept: BEHAVIORAL HEALTH | Facility: CLINIC | Age: 23
End: 2023-06-16
Payer: COMMERCIAL

## 2023-06-16 ENCOUNTER — APPOINTMENT (OUTPATIENT)
Dept: GENERAL RADIOLOGY | Facility: CLINIC | Age: 23
End: 2023-06-16
Attending: EMERGENCY MEDICINE
Payer: COMMERCIAL

## 2023-06-16 LAB
AMPHETAMINES UR QL SCN: ABNORMAL
ATRIAL RATE - MUSE: 118 BPM
BARBITURATES UR QL SCN: ABNORMAL
BENZODIAZ UR QL SCN: ABNORMAL
BZE UR QL SCN: ABNORMAL
C TRACH DNA SPEC QL NAA+PROBE: NEGATIVE
CANNABINOIDS UR QL SCN: ABNORMAL
DIASTOLIC BLOOD PRESSURE - MUSE: NORMAL MMHG
HIV 1+2 AB+HIV1 P24 AG SERPL QL IA: NONREACTIVE
INTERPRETATION ECG - MUSE: NORMAL
N GONORRHOEA DNA SPEC QL NAA+PROBE: NEGATIVE
OPIATES UR QL SCN: ABNORMAL
P AXIS - MUSE: 51 DEGREES
PR INTERVAL - MUSE: 136 MS
QRS DURATION - MUSE: 84 MS
QT - MUSE: 326 MS
QTC - MUSE: 456 MS
R AXIS - MUSE: 62 DEGREES
SARS-COV-2 RNA RESP QL NAA+PROBE: NEGATIVE
SYSTOLIC BLOOD PRESSURE - MUSE: NORMAL MMHG
T AXIS - MUSE: 41 DEGREES
T PALLIDUM AB SER QL: NONREACTIVE
VENTRICULAR RATE- MUSE: 118 BPM

## 2023-06-16 PROCEDURE — 36415 COLL VENOUS BLD VENIPUNCTURE: CPT | Performed by: EMERGENCY MEDICINE

## 2023-06-16 PROCEDURE — 87591 N.GONORRHOEAE DNA AMP PROB: CPT | Performed by: EMERGENCY MEDICINE

## 2023-06-16 PROCEDURE — 87389 HIV-1 AG W/HIV-1&-2 AB AG IA: CPT | Performed by: EMERGENCY MEDICINE

## 2023-06-16 PROCEDURE — 87491 CHLMYD TRACH DNA AMP PROBE: CPT | Performed by: EMERGENCY MEDICINE

## 2023-06-16 PROCEDURE — 71046 X-RAY EXAM CHEST 2 VIEWS: CPT

## 2023-06-16 PROCEDURE — 73610 X-RAY EXAM OF ANKLE: CPT | Mod: RT

## 2023-06-16 PROCEDURE — 258N000003 HC RX IP 258 OP 636: Performed by: EMERGENCY MEDICINE

## 2023-06-16 PROCEDURE — 80307 DRUG TEST PRSMV CHEM ANLYZR: CPT | Performed by: EMERGENCY MEDICINE

## 2023-06-16 PROCEDURE — 86780 TREPONEMA PALLIDUM: CPT | Performed by: EMERGENCY MEDICINE

## 2023-06-16 PROCEDURE — 87635 SARS-COV-2 COVID-19 AMP PRB: CPT | Performed by: EMERGENCY MEDICINE

## 2023-06-16 PROCEDURE — 250N000013 HC RX MED GY IP 250 OP 250 PS 637: Performed by: EMERGENCY MEDICINE

## 2023-06-16 RX ORDER — OLANZAPINE 10 MG/2ML
10 INJECTION, POWDER, FOR SOLUTION INTRAMUSCULAR 2 TIMES DAILY PRN
Status: DISCONTINUED | OUTPATIENT
Start: 2023-06-16 | End: 2023-06-17 | Stop reason: HOSPADM

## 2023-06-16 RX ORDER — OLANZAPINE 5 MG/1
5 TABLET, ORALLY DISINTEGRATING ORAL AT BEDTIME
Status: DISCONTINUED | OUTPATIENT
Start: 2023-06-16 | End: 2023-06-17 | Stop reason: HOSPADM

## 2023-06-16 RX ORDER — IBUPROFEN 600 MG/1
600 TABLET, FILM COATED ORAL EVERY 6 HOURS PRN
Status: DISCONTINUED | OUTPATIENT
Start: 2023-06-16 | End: 2023-06-17 | Stop reason: HOSPADM

## 2023-06-16 RX ORDER — ACETAMINOPHEN 325 MG/1
650 TABLET ORAL EVERY 4 HOURS PRN
Status: DISCONTINUED | OUTPATIENT
Start: 2023-06-16 | End: 2023-06-17 | Stop reason: HOSPADM

## 2023-06-16 RX ORDER — OLANZAPINE 5 MG/1
10 TABLET, ORALLY DISINTEGRATING ORAL AT BEDTIME
Status: DISCONTINUED | OUTPATIENT
Start: 2023-06-16 | End: 2023-06-16

## 2023-06-16 RX ADMIN — SODIUM CHLORIDE, POTASSIUM CHLORIDE, SODIUM LACTATE AND CALCIUM CHLORIDE 1000 ML: 600; 310; 30; 20 INJECTION, SOLUTION INTRAVENOUS at 01:58

## 2023-06-16 RX ADMIN — IBUPROFEN 600 MG: 600 TABLET ORAL at 15:31

## 2023-06-16 RX ADMIN — ACETAMINOPHEN 650 MG: 325 TABLET, FILM COATED ORAL at 10:44

## 2023-06-16 RX ADMIN — SODIUM CHLORIDE, POTASSIUM CHLORIDE, SODIUM LACTATE AND CALCIUM CHLORIDE 1000 ML: 600; 310; 30; 20 INJECTION, SOLUTION INTRAVENOUS at 00:34

## 2023-06-16 RX ADMIN — OLANZAPINE 5 MG: 5 TABLET, ORALLY DISINTEGRATING ORAL at 22:22

## 2023-06-16 ASSESSMENT — ACTIVITIES OF DAILY LIVING (ADL)
ADLS_ACUITY_SCORE: 37
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
## 2023-06-16 NOTE — ED NOTES
IP MH Referral Acuity Rating Score (RARS)    LMHP complete at referral to IP MH, with DEC; and, daily while awaiting IP MH placement. Call score to PPS.  CRITERIA SCORING   New 70 HH and Involuntary for IP MH (not adolescent) 0/1   Boarding over 24 hours 0/1   Vulnerable adult at least 55+ with multiple co morbidities; or, Patient age 11 or under 0/1   Suicide ideation without relief of precipitating factors 0/1   Current plan for suicide 0/1   Current plan for homicide 0/1   Imminent risk or actual attempt to seriously harm another without relief of factors precipitating the attempt 0/1   Severe dysfunction in daily living (ex: complete neglect for self care, extreme disruption in vegetative function, extreme deterioration in social interactions) 1/1   Recent (last 2 weeks) or current physical aggression in the ED 0/1   Restraints or seclusion episode in ED 0/1   Verbal aggression, agitation, yelling, etc., while in the ED 0/1   Active psychosis with psychomotor agitation or catatonia 1/1   Need for constant or near constant redirection (from leaving, from others, etc).  1/1   Intrusive or disruptive behaviors 1/1   TOTAL Acuity Total Score: 4

## 2023-06-16 NOTE — PHARMACY-ADMISSION MEDICATION HISTORY
Pharmacist Admission Medication History    Admission medication history is complete. The information provided in this note is only as accurate as the sources available at the time of the update.    Medication reconciliation/reorder completed by provider prior to medication history? No    Information Source(s): Patient via in-person    Pertinent Information: None    Changes made to PTA medication list:    Added: None    Deleted: gabapentin, hydroxyzine, olanzapine    Changed: None       Allergies reviewed with patient and updates made in EHR: yes    Medication History Completed By: Leticia Thomas RPH 6/16/2023 10:03 AM    Prior to Admission medications    Not on File
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
## 2023-06-16 NOTE — TELEPHONE ENCOUNTER
[place]  Access Inpatient Bed Call Log 6/15/23 @ 1:30 AM     Intake has called facilities that have not updated their bed status within the last 12 hours  Placement Distance: [facility] is posting 0 beds.                     [facility] is posting 0 beds. [phone]               [facility] is posting 0 beds. [phone]                     [facility] is posting 0 beds. [phone] - 1:30AM Per Vadim, they are capped.              [facility] is posting 0 beds. [phone]                   [facility] is posting 0 beds. [phone]                   [facility] is posting 0 beds. [phone]                   [facility] is posting 0 beds. [phone]    Pt remains on work list pending appropriate bed availability
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
## 2023-06-16 NOTE — ED NOTES
RN ED Mental Health Handoff Note    Voluntary    Does patient require 1:1? No    Hold and rights been given and documented for patient: No, NA    Is the patient in  scrubs? Yes    Has the patient been searched? Patient changed into scrubs dt clothing being wet and clothes taken home by mom. Coat in belonging bag in Dec office    Is the 15 minute observation tool up to date? NA    Was patient issued a welcome folder? NA    Room check completed this shift: Yes    PSS3 and Gooding Assessment/Reassessment this shift:    PSS-3      Date and Time Over the past 2 weeks have you felt down, depressed, or hopeless? Over the past 2 weeks have you had thoughts of killing yourself? Have you ever attempted to kill yourself? When did this last happen? User   06/15/23 4777 no no yes more than 6 months ago             Behavioral status of patient: Green    Code 21 called this shift? No    Use of restraints/seclusion this shift? No    Most recent vital signs:  Temp: 98.1  F (36.7  C) Temp src: Oral BP: 114/67 Pulse: 72   Resp: 18 SpO2: 100 % O2 Device: None (Room air)      Medications:  Scheduled medication compliance? No    PRN Meds administered this shift? Yes    Medications   acetaminophen (TYLENOL) tablet 650 mg (has no administration in time range)   ibuprofen (ADVIL/MOTRIN) tablet 600 mg (has no administration in time range)   OLANZapine (zyPREXA) injection 10 mg (has no administration in time range)   OLANZapine zydis (zyPREXA) ODT tab 5 mg (has no administration in time range)   OLANZapine zydis (zyPREXA) ODT tab 5 mg (5 mg Oral $Given 6/15/23 2327)   albuterol (PROVENTIL HFA/VENTOLIN HFA) inhaler (2 puffs Inhalation $Given 6/15/23 2326)   lactated ringers BOLUS 1,000 mL (0 mLs Intravenous Stopped 6/16/23 0201)   lactated ringers BOLUS 1,000 mL (0 mLs Intravenous Stopped 6/16/23 0610)         ADLs    Meal Provided this shift? No    Hygiene items provided? No    ADLs completed? No    Date of last shower: unknown    Any  information that would be helpful in caring for this patient?  Paranoid    Family present/updated? Yes but mom left to do things    Location of patient's belongings: DEC office    Critical Care Minutes:  Does the patient need critical care minutes documented? No

## 2023-06-16 NOTE — ED NOTES
Bed: ED15  Expected date: 6/15/23  Expected time: 10:31 PM  Means of arrival: Ambulance  Comments:  [place] 22M schizophrenia-not on meds. cooperative

## 2023-06-17 ENCOUNTER — TELEPHONE (OUTPATIENT)
Dept: BEHAVIORAL HEALTH | Facility: CLINIC | Age: 23
End: 2023-06-17

## 2023-06-17 ENCOUNTER — HOSPITAL ENCOUNTER (INPATIENT)
Facility: CLINIC | Age: 23
LOS: 2 days | Discharge: HOME | DRG: 885 | End: 2023-06-19
Attending: PSYCHIATRY & NEUROLOGY | Admitting: PSYCHIATRY & NEUROLOGY
Payer: COMMERCIAL

## 2023-06-17 VITALS
OXYGEN SATURATION: 100 % | HEART RATE: 70 BPM | DIASTOLIC BLOOD PRESSURE: 57 MMHG | TEMPERATURE: 100 F | RESPIRATION RATE: 16 BRPM | SYSTOLIC BLOOD PRESSURE: 124 MMHG

## 2023-06-17 DIAGNOSIS — F23 BRIEF PSYCHOTIC DISORDER (H): Primary | ICD-10-CM

## 2023-06-17 PROBLEM — F29 PSYCHOSIS (H): Status: ACTIVE | Noted: 2023-06-17

## 2023-06-17 PROCEDURE — 99223 1ST HOSP IP/OBS HIGH 75: CPT | Mod: AI | Performed by: CLINICAL NURSE SPECIALIST

## 2023-06-17 PROCEDURE — 128N000002 HC R&B CD/MH ADOLESCENT

## 2023-06-17 PROCEDURE — 250N000013 HC RX MED GY IP 250 OP 250 PS 637: Performed by: CLINICAL NURSE SPECIALIST

## 2023-06-17 RX ORDER — AMOXICILLIN 250 MG
1 CAPSULE ORAL 2 TIMES DAILY PRN
Status: DISCONTINUED | OUTPATIENT
Start: 2023-06-17 | End: 2023-06-19 | Stop reason: HOSPADM

## 2023-06-17 RX ORDER — OLANZAPINE 10 MG/2ML
10 INJECTION, POWDER, FOR SOLUTION INTRAMUSCULAR 3 TIMES DAILY PRN
Status: DISCONTINUED | OUTPATIENT
Start: 2023-06-17 | End: 2023-06-19 | Stop reason: HOSPADM

## 2023-06-17 RX ORDER — OLANZAPINE 5 MG/1
5-10 TABLET ORAL 3 TIMES DAILY PRN
Status: DISCONTINUED | OUTPATIENT
Start: 2023-06-17 | End: 2023-06-19 | Stop reason: HOSPADM

## 2023-06-17 RX ORDER — MAGNESIUM HYDROXIDE/ALUMINUM HYDROXICE/SIMETHICONE 120; 1200; 1200 MG/30ML; MG/30ML; MG/30ML
30 SUSPENSION ORAL EVERY 4 HOURS PRN
Status: DISCONTINUED | OUTPATIENT
Start: 2023-06-17 | End: 2023-06-19 | Stop reason: HOSPADM

## 2023-06-17 RX ORDER — TRAZODONE HYDROCHLORIDE 50 MG/1
50 TABLET, FILM COATED ORAL
Status: DISCONTINUED | OUTPATIENT
Start: 2023-06-17 | End: 2023-06-19 | Stop reason: HOSPADM

## 2023-06-17 RX ORDER — OLANZAPINE 5 MG/1
5 TABLET ORAL AT BEDTIME
Status: DISCONTINUED | OUTPATIENT
Start: 2023-06-17 | End: 2023-06-19 | Stop reason: HOSPADM

## 2023-06-17 RX ORDER — ACETAMINOPHEN 325 MG/1
650 TABLET ORAL EVERY 4 HOURS PRN
Status: DISCONTINUED | OUTPATIENT
Start: 2023-06-17 | End: 2023-06-19 | Stop reason: HOSPADM

## 2023-06-17 RX ORDER — HYDROXYZINE HYDROCHLORIDE 25 MG/1
25-50 TABLET, FILM COATED ORAL EVERY 4 HOURS PRN
Status: DISCONTINUED | OUTPATIENT
Start: 2023-06-17 | End: 2023-06-19 | Stop reason: HOSPADM

## 2023-06-17 RX ADMIN — OLANZAPINE 5 MG: 5 TABLET, FILM COATED ORAL at 19:50

## 2023-06-17 ASSESSMENT — ACTIVITIES OF DAILY LIVING (ADL)
TOILETING_ISSUES: NO
ADLS_ACUITY_SCORE: 42
LAUNDRY: WITH SUPERVISION
ADLS_ACUITY_SCORE: 42
DIFFICULTY_EATING/SWALLOWING: NO
FALL_HISTORY_WITHIN_LAST_SIX_MONTHS: NO
CONCENTRATING,_REMEMBERING_OR_MAKING_DECISIONS_DIFFICULTY: NO
ADLS_ACUITY_SCORE: 42
ORAL_HYGIENE: INDEPENDENT
ADLS_ACUITY_SCORE: 37
DRESS: SCRUBS (BEHAVIORAL HEALTH);INDEPENDENT
CHANGE_IN_FUNCTIONAL_STATUS_SINCE_ONSET_OF_CURRENT_ILLNESS/INJURY: NO
ADLS_ACUITY_SCORE: 55
DOING_ERRANDS_INDEPENDENTLY_DIFFICULTY: NO
ADLS_ACUITY_SCORE: 37
WEAR_GLASSES_OR_BLIND: NO
WALKING_OR_CLIMBING_STAIRS_DIFFICULTY: NO
HYGIENE/GROOMING: INDEPENDENT
ADLS_ACUITY_SCORE: 42
ADLS_ACUITY_SCORE: 42
HYGIENE/GROOMING: HANDWASHING;INDEPENDENT;SHOWER
ADLS_ACUITY_SCORE: 42
DRESS: SCRUBS (BEHAVIORAL HEALTH)
ADLS_ACUITY_SCORE: 37
ORAL_HYGIENE: INDEPENDENT
DRESSING/BATHING_DIFFICULTY: NO
ADLS_ACUITY_SCORE: 37
ADLS_ACUITY_SCORE: 42

## 2023-06-17 NOTE — PROGRESS NOTES
"23 yo male arrived from [facility] at 0809, voluntary basis. See intake report for pt's story. Admitted w dx of schizophrenia, PTSD, substance use. States used cocaine 3 days ago, marijuana 2 weeks ago, past use of Xanax-not since 2015. States drinks 2-4 beers, about once a month; spec cup given for urine tox collection. He said he has not taken any meds for 2 years. Pt is calm, pleasant cooperative; affect full range. His mood is \"good.\" He denies all current MH sx. His goal for the day is \"being good and behaving.\" Pt's goal for hosp stay is \"to get correct meds, and leave as soon as possible. Admission orders are in, pt oriented to unit.    1328) Pt has been napping in his rm, after lunch. He appeared calm, comfortable, when in the milieu. Pt was reminded twice, to get urine spec for tox. He was positive for marijuana and cocaine, per yesterday's result.  "

## 2023-06-17 NOTE — CARE PLAN
06/17/23 0850   Patient Belongings   Did you bring any home meds/supplements to the hospital?  No   Patient Belongings locker   Patient Belongings Put in Hospital Secure Location (Security or Locker, etc.) cell phone/electronics   Belongings Search Yes   Clothing Search Yes   Second Staff Nurse Jono OHARA and nurse Yair FLORES   Comment Belongings patient patient locker: iphone, cell phone      A               Admission:  I am responsible for any personal items that are not sent to the safe or pharmacy.  [facility] is not responsible for loss, theft or damage of any property in my possession.    Signature:  _________________________________ Date: _______  Time: _____                                              Staff Signature:  ____________________________ Date: ________  Time: _____      2nd Staff person, if patient is unable/unwilling to sign:    Signature: ________________________________ Date: ________  Time: _____     Discharge:  [facility] has returned all of my personal belongings:    Signature: _________________________________ Date: ________  Time: _____                                          Staff Signature:  ____________________________ Date: ________  Time: _____

## 2023-06-17 NOTE — CONSULTS
Name:  Juan Carlos Edouard  :  2000  Date:   2023  Location of patient: [facility]    Location of doctor:  [place]  Length of consult:  10 minutes phone consult    Discussed with Staff:  Analia      Assessment:   22 year old male with psychosis who is recommended for the inpatient mental health unit.    Medically cleared by the ED.    Plan:   Voluntary for inpatient mental health unit.     Van Taylor M.D.  Psychiatry
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
## 2023-06-17 NOTE — TELEPHONE ENCOUNTER
00:33 - Called ED and spoke w/ RN, who reports pt has been calm and cooperative. Pt took his scheduled meds at 10PM, ate and went to bed  00:49 - Left message for Array provider. Awaiting CB  02:24 - Dr. Taylor accepts for  admission. Placed pt in queue for 6A (4A)    R: ALANA / Naegele / Nadir    02:31 - Notified unit RN, who will call Intake back  03:49 - Unit called back - Report anytime  03:50 - Notified ED

## 2023-06-17 NOTE — ED PROVIDER NOTES
Stable evening shift, no issues developed.  Continue to await inpatient bed assignment for voluntary admission.     Fredis Eisenberg MD  06/16/23 4906
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
## 2023-06-17 NOTE — ED PROVIDER NOTES
[facility] ED Behavioral Health Handoff Note:       Brief HPI:  This is a 22 year old male signed out to me by Dr. Uribe .  See initial ED Provider note for details of the presentation.     Patient is medically cleared for admission to a Behavioral Health unit.      Pending studies:none.      Hold Status:  Active Orders   N/A           The patient has not required medication for agitation.    The patient's home medications have been reviewed and ordered/administered.    Exam:   Pulse:  [78] 78  Resp:  [16] 16  BP: (107)/(46) 107/46  SpO2:  [97 %] 97 %        ED Course:    Medications   acetaminophen (TYLENOL) tablet 650 mg (650 mg Oral $Given 6/16/23 1044)   ibuprofen (ADVIL/MOTRIN) tablet 600 mg (600 mg Oral $Given 6/16/23 1531)   OLANZapine (zyPREXA) injection 10 mg (has no administration in time range)   OLANZapine zydis (zyPREXA) ODT tab 5 mg (5 mg Oral $Given 6/16/23 2222)   OLANZapine zydis (zyPREXA) ODT tab 5 mg (5 mg Oral $Given 6/15/23 2327)   albuterol (PROVENTIL HFA/VENTOLIN HFA) inhaler (2 puffs Inhalation $Given 6/15/23 2326)   lactated ringers BOLUS 1,000 mL (0 mLs Intravenous Stopped 6/16/23 0201)   lactated ringers BOLUS 1,000 mL (0 mLs Intravenous Stopped 6/16/23 0610)       There were no significant events while under my care.      Patient was signed out to the oncoming provider. Dr. Gamez      Impression:    ICD-10-CM    1. Paranoia (H)  F22       2. Schizophrenia, unspecified type (H)  F20.9           Plan:    1. Await Transfer to Mental Health Facility      RESULTS:   No results found for this visit on 06/15/23 (from the past 24 hour(s)).          MD Octavio Rich Ryan P, MD  06/17/23 0668

## 2023-06-17 NOTE — PHARMACY-ADMISSION MEDICATION HISTORY
"Admission Medication History Completed by Pharmacy    Please refer to pharmacy progress note on 6/16/23, \"Pharmacy-Admission Medication History\" under previous encounter at [facility] Emergency Dept for information regarding prior to admission medications.    Kelly Waggoner, Pharm.D., [facility]  Behavioral Health Inpatient Pharmacist  [facility] ([facility])  Phone: *[phone] ([employer]) or [phone]      "

## 2023-06-17 NOTE — ED NOTES
RN ED Mental Health Handoff Note    Voluntary but holdable    Does patient require 1:1? No    Hold and rights been given and documented for patient: No    Is the patient in BH scrubs? Yes    Has the patient been searched? Yes    Is the 15 minute observation tool up to date? No    Was patient issued a welcome folder? Yes    Room check completed this shift: Yes    PSS3 and [place] Assessment/Reassessment this shift:    PSS-3    Date and Time Over the past 2 weeks have you felt down, depressed, or hopeless? Over the past 2 weeks have you had thoughts of killing yourself? Have you ever attempted to kill yourself? When did this last happen? User   06/15/23 2247 no no yes more than 6 months ago       C-SSRS ([place])    Date and Time Q1 Wished to be Dead (Past Month) Q2 Suicidal Thoughts (Past Month) Q3 Suicidal Thought Method Q4 Suicidal Intent without Specific Plan Q5 Suicide Intent with Specific Plan Q6 Suicide Behavior (Lifetime) Within the Past 3 Months? RETIRED: Level of Risk per Screen Screening Not Complete User   06/16/23 0819 no no no no no yes -- -- -- NANNETTE          Behavioral status of patient: Green    Code 21 called this shift? No    Use of restraints/seclusion this shift? No    Most recent vital signs:      BP: 107/46 Pulse: 78   Resp: 16 SpO2: 97 % O2 Device: None (Room air)      Medications:  Scheduled medication compliance? Yes    PRN Meds administered this shift? No    Medications   acetaminophen (TYLENOL) tablet 650 mg (650 mg Oral $Given 6/16/23 1044)   ibuprofen (ADVIL/MOTRIN) tablet 600 mg (600 mg Oral $Given 6/16/23 1531)   OLANZapine (zyPREXA) injection 10 mg (has no administration in time range)   OLANZapine zydis (zyPREXA) ODT tab 5 mg (5 mg Oral $Given 6/16/23 2222)   OLANZapine zydis (zyPREXA) ODT tab 5 mg (5 mg Oral $Given 6/15/23 2327)   albuterol (PROVENTIL HFA/VENTOLIN HFA) inhaler (2 puffs Inhalation $Given 6/15/23 2326)   lactated ringers BOLUS 1,000 mL (0 mLs Intravenous Stopped  6/16/23 0201)   lactated ringers BOLUS 1,000 mL (0 mLs Intravenous Stopped 6/16/23 0610)         ADLs    Meal Provided this shift? Yes    Hygiene items provided? Yes    ADLs completed? No    Date of last shower: PTA    Any information that would be helpful in caring for this patient?  Will transfer around 0800    Family present/updated? No    Location of patient's belongings: with patient    Critical Care Minutes:  Does the patient need critical care minutes documented? No

## 2023-06-17 NOTE — TELEPHONE ENCOUNTER
R:  No appropriate [facility] beds are available.    [facility] Access Inpatient Bed Call Log 6/16/23 @ 8:45 PM      Intake has called facilities that have not updated their bed status within the last 12 hours.            [facility] is posting 0 beds.              [facility] is posting 0 beds. [phone]        [facility] is posting 0 beds. [phone]              [facility] is posting 0 beds. [phone]      [facility] is posting 0 beds. [phone]            [facility] is posting 0 beds. [phone]            [facility] is posting 0 beds. [phone]            [facility] is posting 0 beds. [phone]     [facility] through [facility] is posting 0 beds. [phone]        Pt remains on work list pending appropriate bed availability.

## 2023-06-18 PROCEDURE — 128N000002 HC R&B CD/MH ADOLESCENT

## 2023-06-18 PROCEDURE — 250N000013 HC RX MED GY IP 250 OP 250 PS 637: Performed by: CLINICAL NURSE SPECIALIST

## 2023-06-18 RX ADMIN — OLANZAPINE 5 MG: 5 TABLET, FILM COATED ORAL at 19:41

## 2023-06-18 ASSESSMENT — ACTIVITIES OF DAILY LIVING (ADL)
LAUNDRY: WITH SUPERVISION
ADLS_ACUITY_SCORE: 42
ORAL_HYGIENE: INDEPENDENT
ADLS_ACUITY_SCORE: 42
HYGIENE/GROOMING: INDEPENDENT
ADLS_ACUITY_SCORE: 42
LAUNDRY: WITH SUPERVISION
ADLS_ACUITY_SCORE: 42
HYGIENE/GROOMING: HANDWASHING;INDEPENDENT
ADLS_ACUITY_SCORE: 42
DRESS: SCRUBS (BEHAVIORAL HEALTH);INDEPENDENT
ADLS_ACUITY_SCORE: 42
ORAL_HYGIENE: INDEPENDENT
ADLS_ACUITY_SCORE: 42
DRESS: SCRUBS (BEHAVIORAL HEALTH)

## 2023-06-18 NOTE — PLAN OF CARE
Patient appeared asleep for 7hrs during the shift. Patient did not have any behavioral or medical concerns and remain on 15min checks. RN will continue to monitor.

## 2023-06-18 NOTE — PLAN OF CARE
"Goal Outcome Evaluation:    Plan of Care Reviewed With: patient      Pt is calm, pleasant, affect  is full range. His mood is \"good.\" He denies all MH sx.Pt states he'll attend \"some\" grps, will be social w peers, and nap some, today-\"I like to sleep.\" Pt was out to the DR for bkfst. He plans to call his mom, so she can help contact pt's SO and child, in [place]. His goals for the day are \"being nice to people, taking meds, and interacting with other pts.\"    9553) Pt had a good shift. He was in and out of rm some, talking on phone. He was in his rm napping more than in milieu; up for both meals.         "

## 2023-06-18 NOTE — PLAN OF CARE
Goal Outcome Evaluation:    Plan of Care Reviewed With: patient    Patient is a 22 year old  male admitted today with a history of Schizophrenia and Polysubstance abuse. He stopped taking his Zyprexa in 2021, and was restarted today for psychosis. Patient was taken to the ED when mother called the Police after he displayed behavior consistent with paranoia. Utox was done, and he tested positive for cocaine and cannabis. He was calm on the unit, had a shower, and was medication complaint. No other concerns were reported.

## 2023-06-19 VITALS
DIASTOLIC BLOOD PRESSURE: 82 MMHG | BODY MASS INDEX: 27.89 KG/M2 | RESPIRATION RATE: 18 BRPM | HEART RATE: 85 BPM | HEIGHT: 68 IN | SYSTOLIC BLOOD PRESSURE: 128 MMHG | TEMPERATURE: 97.5 F | WEIGHT: 184 LBS | OXYGEN SATURATION: 97 %

## 2023-06-19 PROCEDURE — 99239 HOSP IP/OBS DSCHRG MGMT >30: CPT | Performed by: CLINICAL NURSE SPECIALIST

## 2023-06-19 RX ORDER — OLANZAPINE 5 MG/1
5 TABLET ORAL AT BEDTIME
Qty: 30 TABLET | Refills: 1 | Status: SHIPPED | OUTPATIENT
Start: 2023-06-19

## 2023-06-19 ASSESSMENT — ACTIVITIES OF DAILY LIVING (ADL)
ADLS_ACUITY_SCORE: 42
HYGIENE/GROOMING: INDEPENDENT
ADLS_ACUITY_SCORE: 42
LAUNDRY: WITH SUPERVISION
DRESS: SCRUBS (BEHAVIORAL HEALTH)
ADLS_ACUITY_SCORE: 42
ORAL_HYGIENE: INDEPENDENT

## 2023-06-19 NOTE — PLAN OF CARE
Assessment/Intervention/Current Symptoms and Care Coordination    The patient's care was discussed with the treatment team and chart notes were reviewed     Current Symptoms include the following: Psychosis    Discussed discharge with provider    Researched resources for pt    Called mother to discuss discharge. There was no answer and was unable to LVM due to VM not being set up.     Discharge Plan or Goal  : Return home with outpatient providers    Barriers to Discharge   None    Referral Status  Psychiatry and Therapy    Legal Status  Patient is voluntary        Contacts:  aLly Edouard, mother: [phone]     Upcoming Meetings and Dates/Important Information and next steps:  Pt is discharging home with resources to set up med management and individual therapy.

## 2023-06-19 NOTE — PROGRESS NOTES
06/19/23 1038   Individualization/Patient Specific Goals   Patient Vulnerabilities adverse childhood experience(s);family/relationship conflict;legal concerns;substance abuse/addiction;lacks insight into illness;limited support system   Interprofessional Rounds   Summary Discussed pt's current mental health symptoms and discharge   Participants advanced practice nurse;nursing;OT;CTC   Behavioral Team Discussion   Participants Debra Naegele APRN; Eusebia Garibay RN; Berhane Duron OT; [place] CTC   Progress Recently admitted   Anticipated length of stay 1 day   Continued Stay Criteria/Rationale Pt is discharging with treatement recommendations   Plan Pt is discharging with treatement recommendations   Rationale for change in precautions or plan intial plan   Safety Plan safe secure milieu   Anticipated Discharge Disposition home with family     PRECAUTIONS AND SAFETY    Behavioral Orders   Procedures    Code 1 - Restrict to Unit    Routine Programming     As clinically indicated    Status 15     Every 15 minutes.    Suicide precautions     Patients on Suicide Precautions should have a Combination Diet ordered that includes a Diet selection(s) AND a Behavioral Tray selection for Safe Tray - with utensils, or Safe Tray - NO utensils         Safety  Safety WDL: WDL  Patient Location: patient room, own  Observed Behavior: sitting  Observed Behavior (Comment): sitting  Safety Measures: safety plan reviewed  Diversional Activity: television  Suicidality: Status 15

## 2023-06-19 NOTE — PLAN OF CARE
Problem: Adult Behavioral Health Plan of Care  Goal: Adheres to Safety Considerations for Self and Others  Intervention: Develop and Maintain Individualized Safety Plan  Recent Flowsheet Documentation  Taken 6/18/2023 2200 by Radha Sutherland RN  Safety Measures: safety plan reviewed     Problem: Adult Behavioral Health Plan of Care  Goal: Absence of New-Onset Illness or Injury  Intervention: Identify and Manage Fall Risk  Recent Flowsheet Documentation  Taken 6/18/2023 2200 by Radha Sutherland RN  Safety Measures: safety plan reviewed   Goal Outcome Evaluation:       Patient was sleeping at the beginning of the shift, woke up and ate dinner. He is asking for double portion of food, and wants to go home, as soon as he could be discharged. He was advised to stay for his treatment which he agreed. For the double food portion, please channel requests to the appropriate quarters.He was calm and sociable on the unit, denied pain, and all mental health symptoms. Patient was medication compliant. He also wanted Nicotine gum which was not part of his medication.

## 2023-06-19 NOTE — PLAN OF CARE
Problem: Suicidal Behavior  Goal: Suicidal Behavior is Absent or Managed  Outcome: Progressing   Goal Outcome Evaluation:    Plan of Care Reviewed With: patient          Patient appeared calm and pleasant on approach. He denied pain and all mental health symptoms. He reported that he feels well rested today. He informed the writer that he has so many unfinished jobs waiting for him outside and thus would like to be discharged as soon as possible. He reported good appetite. He spent most of the time isolative and withdrawn in his room.     With order for discharge to home. AVS given to GABI Songs ready, medications in the med room. Patient  verbalized understanding. Patient contracted for safety. He denied all mental health symptoms. Mom will come to pick him up at 1520 hrs.

## 2023-06-19 NOTE — PROGRESS NOTES
06/18/23 2200   Group Therapy Session   Group Attendance attended group session;other (see comments)   Time Session Began 2030   Time Session Ended 2130   Total Time (minutes) 60   Total # Attendees 8-11   Group Type psychotherapeutic   Group Topic Covered cognitive therapy techniques;emotions/expression   Group Session Detail Process/ DBT wise mind   Patient Response/Contribution cooperative with task;discussed personal experience with topic;left the group on several occasions   Patient Participation Detail pt said he felt optimistic, he was in and out of group a few times but participated when he was in group
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
## 2023-06-19 NOTE — PLAN OF CARE
Coordination of Care NOTE:    Care Coordinator scheduled the following appointments:    Medication management: Monday July 10th at 4:40pm via telehealth  Provider: APOLINAR Morales and Dani [facility]  [address] [place] [address]  Phone: [phone]  Fax: [phone]  Notes: Intake paperwork and link to your telehealth appointment will be sent to your email. Telehealth appointment link is sent out a day prior to the appointment time. HUC please fax AVS  Follow Up: Thursday August 10th at 4:40pm via telehealth    Individual Therapy: Wednesday July 12th at 5:00pm via telehealth  Provider: Tony Ross and Dani [address] [address]  Phone: [phone]  Fax: [phone]  Notes: Intake paperwork and link to your telehealth appointment will be sent to your email. Telehealth appointment link is sent out a day prior to the appointment time.     Care Coordinator updated pt's AVS

## 2023-06-19 NOTE — DISCHARGE SUMMARY
"Psychiatric Discharge Summary    Juan Carlos Edouard MRN# [id number]   Age: 22 year old YOB: 2000     Date of Admission:  6/17/2023  Date of Discharge:  6/19/2023  Admitting Physician:  Behzad Schmitz MD  Discharge Physician:  Debra A. Naegele, APRN CNS (Contact: [phone])         Event Leading to Hospitalization:   Juan Carlos Edouard is a 22 year old  male presenting with psychosis. Patient reports he stopped taking medications after his last admission in 2021. Patient reports he had an \"episode\" thinking that Robert Cates was following him and would catch him and label him as a predator. Patient reports he was in a park on [address] trying to meet someone. Patietn reports a \"twila and young girl\" got out of the car. Patient reports he ran away. He got in his car and tried to evade the police. At that point the patient thought the police were chasing him not Robert Cates. Patient reports he tried to swim across a lake trying to evade the police.       Patient shared police asked him why he was running and he said \"I thought you were chasing me and charging me for being a pedophile\" though pt noted police denied that pt was being arrested or having had legal concerns today. Pt noted police were called by his mother and were concerned for his MH and suggested he come to ED.               See Admission note by Naegele, Debra Ann, APRN CNS on 6/17/2023 for additional details.          DIagnoses:   1. Schizophrenia, undifferentiated.   2. Polysubstance abuse         Labs:   No results found for any visits on 06/17/23.         Consults:   No consultations were requested during this admission         Hospital Course:   Juan Carlos Edouard was admitted to [facility] (young adult) with attending Behzad Schmitz MD as a voluntary patient. The patient was placed under status 15 (15 minute checks) to ensure patient safety.     Patient was started on Zyprexa 5 mg to address brief psychotic episode. " Patient reports thinking Robert Cates was following him would name him a pedophile. Patient was cooperative with taking his mediations. Patient reports he was interested in therapy . Provider discussed risks, benefits and side effects of medications with patient.     Provider educated patient on the detrimental effects of substance on his mood and thinking. Recommendation is to abstain from all substance use.     Reviewed admission labs: Creatinine 1.31 elevated. Sodium 134 slightly low, WBC 15.5 elevated. EKG  QT/Qtc 326/456. Recommendation is to follow up with primary care.     Juan Carlos Edouard did participate in groups and was visible in the milieu.     The patient's symptoms of psychosis improved. Patient reports improved mood and denies suicidal thinking. Patient is demonstrating clear thinking. He is not paranoid. Patient is cooperative with taking medications. Patient has protective factors of seeking out treatment and supportive mother.     Juan Carlos Edouard was released to home into mother's care. At the time of discharge Juan Carlos Edouard was determined to not be a danger to himself or others.          Discharge Medications:     Current Discharge Medication List      START taking these medications    Details   OLANZapine (ZYPREXA) 5 MG tablet Take 1 tablet (5 mg) by mouth At Bedtime  Qty: 30 tablet, Refills: 1    Associated Diagnoses: Brief psychotic disorder (H)                  Psychiatric Examination:   Appearance:  awake, alert and adequately groomed  Attitude:  cooperative  Eye Contact:  good  Mood:  better  Affect:  appropriate and in normal range  Speech:  normal prosody  Psychomotor Behavior:  no evidence of tardive dyskinesia, dystonia, or tics  Thought Process:  goal oriented  Associations:  no loose associations  Thought Content:  no evidence of suicidal ideation or homicidal ideation, no auditory hallucinations present and no visual hallucinations present  Insight:  fair  Judgment:  fair  Oriented  to:  time, person, and place  Attention Span and Concentration:  intact  Recent and Remote Memory:  intact  Language: Able to name objects, Able to repeat phrases and Able to read and write  Fund of Knowledge: low-normal  Muscle Strength and Tone: normal  Gait and Station: Normal         Discharge Plan:       Further instructions from your care team       Behavioral Discharge Planning and Instructions    Summary: You were admitted on 6/17/2023  due to Psychotic Symptomology.  You were treated by Debra Naegele, APRN and discharged on 06/19/2023 from 6AE to Home    Main Diagnosis:   1. Schizophrenia, undifferentiated.   2. Polysubstance abuse    Health Care Follow-up:     Medication management: Monday July 10th at 4:40pm via telehealth  Provider: APOLINAR Morales and Dani [address] [address]  Phone: [phone]  Fax: [phone]  Notes: Intake paperwork and link to your telehealth appointment will be sent to your email. Telehealth appointment link is sent out a day prior to the appointment time. HUC please fax AVS  Follow Up: Thursday August 10th at 4:40pm via telehealth    Individual Therapy: Wednesday July 12th at 5:00pm via telehealth  Provider: Tony Ross and Dani [address] [address]  Phone: [phone]  Fax: [phone]  Notes: Intake paperwork and link to your telehealth appointment will be sent to your email. Telehealth appointment link is sent out a day prior to the appointment time.       Major Treatments, Procedures and Findings:  You were provided with: a psychiatric assessment, assessed for medical stability, medication evaluation and/or management, group therapy, and milieu management    Symptoms to Report: feeling more aggressive, increased confusion, losing more sleep, mood getting worse, or thoughts of suicide    Early warning signs can include: increased depression or anxiety  "sleep disturbances increased thoughts or behaviors of suicide or self-harm  increased unusual thinking, such as paranoia or hearing voices    Safety and Wellness:  Take all medicines as directed.  Make no changes unless your doctor suggests them.      Follow treatment recommendations.  Refrain from alcohol and non-prescribed drugs.  Ask your support system to help you reduce your access to items that could harm yourself or others. If there is a concern for safety, call 911.    Resources:   Crisis Intervention: 416.943.9196 or 569-388-3088 (TTY: 541.362.9217).  Call anytime for help.  National [place] on Mental Illness (www.[place].hernan.org): 708.760.9268 or 607-764-9844.  Self- Management and Recovery Training., [place]-- Toll free: 444.729.3781  www.[place]  [facility] Crisis (COPE) Response - Adult 343 569-9462  Text 4 Life: txt \"LIFE\" to [address]12 for immediate support and crisis intervention  Crisis text line: Text \"[place]\" to 121453. Free, confidential, 24/7.  Crisis Intervention: 786.458.3719 or 872-087-9576. Call anytime for help.   [facility] Crisis Team - Child: [phone]    General Medication Instructions:   See your medication sheet(s) for instructions.   Take all medicines as directed.  Make no changes unless your doctor suggests them.   Go to all your doctor visits.  Be sure to have all your required lab tests. This way, your medicines can be refilled on time.  Do not use any drugs not prescribed by your doctor.  Avoid alcohol.    Advance Directives:   Scanned document on file with [employer]? No scanned doc  Is document scanned? Pt states no documents  Honoring Choices Your Rights Handout: Informed and given  Was more information offered? Pt declined    The Treatment team has appreciated the opportunity to work with you. If you have any questions or concerns about your recent admission, you can contact the unit which can receive your call 24 hours a day, 7 days a week. They " will be able to get in touch with a Provider if needed. The unit number is [phone] .        Attestation:  The patient has been seen and evaluated by me,  Debra A. Naegele, APRN CNS on 6/19/2023  Discharge summary time > 30 minutes

## 2023-06-19 NOTE — DISCHARGE INSTRUCTIONS
Behavioral Discharge Planning and Instructions    Summary: You were admitted on 6/17/2023  due to Psychotic Symptomology.  You were treated by Debra Naegele, APRN and discharged on 06/19/2023 from 6AE to Home    Main Diagnosis:   1. Schizophrenia, undifferentiated.   2. Polysubstance abuse    Health Care Follow-up:     Medication management: Monday July 10th at 4:40pm via telehealth  Provider: APOLINAR Morales and Dani [facility]  [address] [place] [address]  Phone: [phone]  Fax: [phone]  Notes: Intake paperwork and link to your telehealth appointment will be sent to your email. Telehealth appointment link is sent out a day prior to the appointment time. HUC please fax AVS  Follow Up: Thursday August 10th at 4:40pm via telehealth    Individual Therapy: Wednesday July 12th at 5:00pm via telehealth  Provider: Tony Ross and Dani [address] [address]  Phone: [phone]  Fax: [phone]  Notes: Intake paperwork and link to your telehealth appointment will be sent to your email. Telehealth appointment link is sent out a day prior to the appointment time.       Major Treatments, Procedures and Findings:  You were provided with: a psychiatric assessment, assessed for medical stability, medication evaluation and/or management, group therapy, and milieu management    Symptoms to Report: feeling more aggressive, increased confusion, losing more sleep, mood getting worse, or thoughts of suicide    Early warning signs can include: increased depression or anxiety sleep disturbances increased thoughts or behaviors of suicide or self-harm  increased unusual thinking, such as paranoia or hearing voices    Safety and Wellness:  Take all medicines as directed.  Make no changes unless your doctor suggests them.      Follow treatment recommendations.  Refrain from alcohol and non-prescribed drugs.  Ask your support system  "to help you reduce your access to items that could harm yourself or others. If there is a concern for safety, call 911.    Resources:   Crisis Intervention: 685.352.8083 or 460-115-3465 (TTY: 310.698.4736).  Call anytime for help.  National [place] on Mental Illness (www.[place].hernan.org): 639.398.2593 or 647-915-8683.  Self- Management and Recovery Training., [employer]-- Toll free: 339.864.9325  www.[place]  [facility] Crisis (COPE) Response - Adult 543 733-6675  Text 4 Life: txt \"LIFE\" to 114[address] for immediate support and crisis intervention  Crisis text line: Text \"[place]\" to 011787. Free, confidential, 24/7.  Crisis Intervention: 836.414.8371 or 278-106-5247. Call anytime for help.   [facility] Crisis Team - Child: [phone]    General Medication Instructions:   See your medication sheet(s) for instructions.   Take all medicines as directed.  Make no changes unless your doctor suggests them.   Go to all your doctor visits.  Be sure to have all your required lab tests. This way, your medicines can be refilled on time.  Do not use any drugs not prescribed by your doctor.  Avoid alcohol.    Advance Directives:   Scanned document on file with [employer]? No scanned doc  Is document scanned? Pt states no documents  Honoring Choices Your Rights Handout: Informed and given  Was more information offered? Pt declined    The Treatment team has appreciated the opportunity to work with you. If you have any questions or concerns about your recent admission, you can contact the unit which can receive your call 24 hours a day, 7 days a week. They will be able to get in touch with a Provider if needed. The unit number is [phone] .  "
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
## 2024-12-16 ENCOUNTER — HOSPITAL ENCOUNTER (EMERGENCY)
Facility: CLINIC | Age: 24
Discharge: HOME | End: 2024-12-17
Attending: STUDENT IN AN ORGANIZED HEALTH CARE EDUCATION/TRAINING PROGRAM | Admitting: STUDENT IN AN ORGANIZED HEALTH CARE EDUCATION/TRAINING PROGRAM

## 2024-12-16 DIAGNOSIS — L02.419 CELLULITIS AND ABSCESS OF LEG: Primary | ICD-10-CM

## 2024-12-16 DIAGNOSIS — L03.119 CELLULITIS AND ABSCESS OF LEG: Primary | ICD-10-CM

## 2024-12-16 LAB
ANION GAP SERPL CALCULATED.3IONS-SCNC: 15 MMOL/L (ref 7–15)
BASOPHILS # BLD AUTO: 0 10E3/UL (ref 0–0.2)
BASOPHILS NFR BLD AUTO: 0 %
BUN SERPL-MCNC: 14.3 MG/DL (ref 6–20)
CALCIUM SERPL-MCNC: 8.8 MG/DL (ref 8.8–10.4)
CHLORIDE SERPL-SCNC: 101 MMOL/L (ref 98–107)
CREAT SERPL-MCNC: 1.02 MG/DL (ref 0.67–1.17)
EGFRCR SERPLBLD CKD-EPI 2021: >90 ML/MIN/1.73M2
EOSINOPHIL # BLD AUTO: 0 10E3/UL (ref 0–0.7)
EOSINOPHIL NFR BLD AUTO: 0 %
ERYTHROCYTE [DISTWIDTH] IN BLOOD BY AUTOMATED COUNT: 12.3 % (ref 10–15)
GLUCOSE SERPL-MCNC: 92 MG/DL (ref 70–99)
HCO3 BLDV-SCNC: 26 MMOL/L (ref 21–28)
HCO3 SERPL-SCNC: 21 MMOL/L (ref 22–29)
HCT VFR BLD AUTO: 41.3 % (ref 40–53)
HGB BLD-MCNC: 14 G/DL (ref 13.3–17.7)
IMM GRANULOCYTES # BLD: 0 10E3/UL
IMM GRANULOCYTES NFR BLD: 0 %
LACTATE BLD-SCNC: 0.7 MMOL/L
LYMPHOCYTES # BLD AUTO: 1.5 10E3/UL (ref 0.8–5.3)
LYMPHOCYTES NFR BLD AUTO: 16 %
MCH RBC QN AUTO: 29.4 PG (ref 26.5–33)
MCHC RBC AUTO-ENTMCNC: 33.9 G/DL (ref 31.5–36.5)
MCV RBC AUTO: 87 FL (ref 78–100)
MONOCYTES # BLD AUTO: 0.9 10E3/UL (ref 0–1.3)
MONOCYTES NFR BLD AUTO: 10 %
NEUTROPHILS # BLD AUTO: 6.6 10E3/UL (ref 1.6–8.3)
NEUTROPHILS NFR BLD AUTO: 73 %
NRBC # BLD AUTO: 0 10E3/UL
NRBC BLD AUTO-RTO: 0 /100
PCO2 BLDV: 45 MM HG (ref 40–50)
PH BLDV: 7.37 [PH] (ref 7.32–7.43)
PLATELET # BLD AUTO: 280 10E3/UL (ref 150–450)
PO2 BLDV: 30 MM HG (ref 25–47)
POTASSIUM SERPL-SCNC: 4.1 MMOL/L (ref 3.4–5.3)
RBC # BLD AUTO: 4.76 10E6/UL (ref 4.4–5.9)
SAO2 % BLDV: 54 % (ref 70–75)
SODIUM SERPL-SCNC: 137 MMOL/L (ref 135–145)
WBC # BLD AUTO: 9 10E3/UL (ref 4–11)

## 2024-12-16 PROCEDURE — 36415 COLL VENOUS BLD VENIPUNCTURE: CPT | Performed by: STUDENT IN AN ORGANIZED HEALTH CARE EDUCATION/TRAINING PROGRAM

## 2024-12-16 PROCEDURE — 85041 AUTOMATED RBC COUNT: CPT | Performed by: STUDENT IN AN ORGANIZED HEALTH CARE EDUCATION/TRAINING PROGRAM

## 2024-12-16 PROCEDURE — 84520 ASSAY OF UREA NITROGEN: CPT | Performed by: STUDENT IN AN ORGANIZED HEALTH CARE EDUCATION/TRAINING PROGRAM

## 2024-12-16 PROCEDURE — 82803 BLOOD GASES ANY COMBINATION: CPT

## 2024-12-16 PROCEDURE — 99285 EMERGENCY DEPT VISIT HI MDM: CPT | Mod: 25

## 2024-12-16 PROCEDURE — 80048 BASIC METABOLIC PNL TOTAL CA: CPT | Performed by: STUDENT IN AN ORGANIZED HEALTH CARE EDUCATION/TRAINING PROGRAM

## 2024-12-16 PROCEDURE — 96374 THER/PROPH/DIAG INJ IV PUSH: CPT

## 2024-12-16 PROCEDURE — 250N000011 HC RX IP 250 OP 636: Performed by: STUDENT IN AN ORGANIZED HEALTH CARE EDUCATION/TRAINING PROGRAM

## 2024-12-16 PROCEDURE — 10060 I&D ABSCESS SIMPLE/SINGLE: CPT

## 2024-12-16 PROCEDURE — 85004 AUTOMATED DIFF WBC COUNT: CPT | Performed by: STUDENT IN AN ORGANIZED HEALTH CARE EDUCATION/TRAINING PROGRAM

## 2024-12-16 RX ORDER — LIDOCAINE HYDROCHLORIDE 10 MG/ML
5 INJECTION, SOLUTION EPIDURAL; INFILTRATION; INTRACAUDAL; PERINEURAL ONCE
Status: DISCONTINUED | OUTPATIENT
Start: 2024-12-16 | End: 2024-12-17 | Stop reason: HOSPADM

## 2024-12-16 RX ORDER — KETOROLAC TROMETHAMINE 15 MG/ML
15 INJECTION, SOLUTION INTRAMUSCULAR; INTRAVENOUS ONCE
Status: COMPLETED | OUTPATIENT
Start: 2024-12-16 | End: 2024-12-16

## 2024-12-16 RX ORDER — SULFAMETHOXAZOLE AND TRIMETHOPRIM 800; 160 MG/1; MG/1
1 TABLET ORAL 2 TIMES DAILY
Qty: 14 TABLET | Refills: 0 | Status: SHIPPED | OUTPATIENT
Start: 2024-12-16 | End: 2024-12-23

## 2024-12-16 RX ADMIN — KETOROLAC TROMETHAMINE 15 MG: 15 INJECTION, SOLUTION INTRAMUSCULAR; INTRAVENOUS at 23:38

## 2024-12-16 ASSESSMENT — ACTIVITIES OF DAILY LIVING (ADL): ADLS_ACUITY_SCORE: 41

## 2024-12-16 ASSESSMENT — COLUMBIA-SUICIDE SEVERITY RATING SCALE - C-SSRS
2. HAVE YOU ACTUALLY HAD ANY THOUGHTS OF KILLING YOURSELF IN THE PAST MONTH?: NO
6. HAVE YOU EVER DONE ANYTHING, STARTED TO DO ANYTHING, OR PREPARED TO DO ANYTHING TO END YOUR LIFE?: NO
1. IN THE PAST MONTH, HAVE YOU WISHED YOU WERE DEAD OR WISHED YOU COULD GO TO SLEEP AND NOT WAKE UP?: NO

## 2024-12-17 VITALS
OXYGEN SATURATION: 99 % | BODY MASS INDEX: 32.94 KG/M2 | TEMPERATURE: 98.2 F | HEART RATE: 109 BPM | DIASTOLIC BLOOD PRESSURE: 84 MMHG | RESPIRATION RATE: 18 BRPM | SYSTOLIC BLOOD PRESSURE: 140 MMHG | HEIGHT: 67 IN | WEIGHT: 209.88 LBS

## 2024-12-17 LAB
HOLD SPECIMEN: NORMAL
HOLD SPECIMEN: NORMAL

## 2024-12-17 ASSESSMENT — ACTIVITIES OF DAILY LIVING (ADL): ADLS_ACUITY_SCORE: 41

## 2024-12-17 NOTE — ED TRIAGE NOTES
Pt noticed an area of redness on his left thigh yesterday around 1500. The area has continued to grow in redness/heat. Pt is concerned he was bit by a spider. The area of redness/warmth is larger than a half dollar coin.     Pt is A/O x 4. ABCs intact.

## 2024-12-17 NOTE — ED PROVIDER NOTES
"  Emergency Department Note      History of Present Illness     Chief Complaint   Wound Infection      HPI   Juan Carlos Edouard is a very pleasant 24 year old male with history of psychosis presenting with a wound infection. The patient reports noticing a bump like quarter sized area of redness on his left thigh yesterday at 1500; at first he thought it might have been a pimple. Today Juan Carlos went to work and began to have pain around the site of the bump, taking tylenol at 0730 with relief. After he got home, his pain worsened; after Googling his symptoms he thought he might have been bitten by a spider. He notes pain when he moves his left leg. He also notes that his symptoms may be attributed to wearing a pair of pants he got from the [employer] store. Upon evaluation he feels the start of a subjective fever and a sore throat. The patient denies chills, rashes on other parts of his body or other symptoms. He has not been to the woods recently or traveled anywhere. He also denies injecting his leg.     Independent Historian   None    Review of External Notes   I personally reviewed notes from the patient's discharge summary dated  06/19/23 . This provided me with information regarding patient's baseline medical problems.     I personally reviewed the patient's chart, including available medication list and available past medical history, past surgical history, family history, and social history.    Physical Exam     Patient Vitals for the past 24 hrs:   BP Temp Temp src Pulse Resp SpO2 Height Weight   12/17/24 0146 -- -- -- -- 18 -- -- --   12/17/24 0135 -- -- -- 109 -- -- -- --   12/16/24 2257 (!) 140/84 98.2  F (36.8  C) Oral (!) 122 18 99 % 1.702 m (5' 7\") 95.2 kg (209 lb 14.1 oz)     Physical Exam  Vitals and nursing note reviewed.   Constitutional:       Appearance: Normal appearance. He is not ill-appearing or diaphoretic.   Cardiovascular:      Rate and Rhythm: Regular rhythm. Tachycardia present.   Pulmonary:      " Effort: Pulmonary effort is normal.   Musculoskeletal:         General: No swelling, tenderness, deformity or signs of injury. Normal range of motion.   Skin:     General: Skin is warm and dry.      Findings: Erythema present.      Comments: Of the left lateral thigh, there is an area of redness, warmth, tenderness.  There is a small area in the middle with some induration and small amount of purulent drainage below the surface.  See picture below.   Neurological:      Mental Status: He is alert and oriented to person, place, and time.           Diagnostics     Lab Results   Labs Ordered and Resulted from Time of ED Arrival to Time of ED Departure   BASIC METABOLIC PANEL - Abnormal       Result Value    Sodium 137      Potassium 4.1      Chloride 101      Carbon Dioxide (CO2) 21 (*)     Anion Gap 15      Urea Nitrogen 14.3      Creatinine 1.02      GFR Estimate >90      Calcium 8.8      Glucose 92     ISTAT GASES LACTATE VENOUS POCT - Abnormal    Lactic Acid POCT 0.7      Bicarbonate Venous POCT 26      O2 Sat, Venous POCT 54 (*)     pCO2 Venous POCT 45      pH Venous POCT 7.37      pO2 Venous POCT 30     CBC WITH PLATELETS AND DIFFERENTIAL    WBC Count 9.0      RBC Count 4.76      Hemoglobin 14.0      Hematocrit 41.3      MCV 87      MCH 29.4      MCHC 33.9      RDW 12.3      Platelet Count 280      % Neutrophils 73      % Lymphocytes 16      % Monocytes 10      % Eosinophils 0      % Basophils 0      % Immature Granulocytes 0      NRBCs per 100 WBC 0      Absolute Neutrophils 6.6      Absolute Lymphocytes 1.5      Absolute Monocytes 0.9      Absolute Eosinophils 0.0      Absolute Basophils 0.0      Absolute Immature Granulocytes 0.0      Absolute NRBCs 0.0         Imaging   POC US SOFT TISSUE   Final Result     Soft Tissue Ultrasound      Procedure Name: POC Ultrasound Soft Tissue Exam      Indication: Swelling, Erythema, and Pain      Views: Skin and Subcutaneous tissue location: Right thigh      Findings: Mild  cobblestoning and small fluid collection      Impression: Findings consistent with abscess and cellulitis      Study performed by: STEPHANIE MAURICIO MD       Images archived: No - problem with connectivity of network meant images could not be saved             EKG   No ECG performed.     Independent Interpretation - See ED Course Below    ED Course      Medications Administered   Medications   lidocaine (PF) (XYLOCAINE) 1 % injection 5 mL (has no administration in time range)   ketorolac (TORADOL) injection 15 mg (15 mg Intravenous $Given 12/16/24 2132)       Procedures   Procedures     Incision and Drainage     Procedure: Incision and Drainage     Consent: Verbal    Indication: Abscess    Location: Extremity, lower left    Size: 1 cm    Ultrasound Guidance: No    Preparation: Alcohol     Anesthesia/Sedation: Lidocaine - 1%     Procedure Detail:    Aspiration: Yes  Incision Type: Stab  Scalpel: 18 ga needle  Lesion Management: Probed and deloculated   Wound Management: Left open   Packing: None     Patient Status: The patient tolerated the procedure well: Yes. There were no complications.    Discussion of Management - See ED Course Below    ED Course   Independent Interpretation / Discussion of Management / Repeat Assessments  ED Course as of 12/17/24 0318   Mon Dec 16, 2024   2307 I obtained the history and examined the patient as noted above.     Tue Dec 17, 2024   0014 I rechecked the patient and explained findings.    0135 I I rechecked the patient.        Additional Documentation  None    Medical Decision Making / Diagnosis     CMS Diagnoses: None    MIPS       None    MDM   This patient has pain and swelling of the left leg.   Vital signs are notable for tachycardia.   Examination appears consistent with cellulitis and a did have suspicion for abscess given small amount of purulent drainage that was in evidence and induration.  Bedside ultrasound was performed to evaluate for abscess and showed a small fluid  collection measuring about 1 cm in maximal diameter.  Incision and drainage was performed as above.  I believe the patient's tachycardia was secondary to pain.  We did obtain labs and he had no leukocytosis, neutrophilia, and lactic acid was within normal limits. Not showing sepsis physiology, will proceed with outpatient management.  Will plan for treatment course with Bactrim.  Plan for follow-up with primary care clinician  in 1 week for for reevaluation. Findings were discussed.  Additional verbal instructions were provided.  I discussed specific warning signs and instructed the patient to return to the emergency department if there are any concerns. Understanding of instructions was voiced, questions were answered and the patient was discharged.     Disposition   The patient was discharged.     Diagnosis     ICD-10-CM    1. Cellulitis and abscess of leg  L03.119     L02.419            Discharge Medications   Discharge Medication List as of 12/17/2024  1:47 AM        START taking these medications    Details   sulfamethoxazole-trimethoprim (BACTRIM DS) 800-160 MG tablet Take 1 tablet by mouth 2 times daily for 7 days., Disp-14 tablet, R-0, E-Prescribe              Omero Ashby MD  12/17/24 3381     55

## 2024-12-25 ENCOUNTER — HOSPITAL ENCOUNTER (EMERGENCY)
Facility: CLINIC | Age: 24
Discharge: HOME | End: 2024-12-25
Attending: EMERGENCY MEDICINE

## 2024-12-25 VITALS
DIASTOLIC BLOOD PRESSURE: 77 MMHG | HEART RATE: 98 BPM | OXYGEN SATURATION: 95 % | RESPIRATION RATE: 18 BRPM | SYSTOLIC BLOOD PRESSURE: 110 MMHG | TEMPERATURE: 97.8 F

## 2024-12-25 DIAGNOSIS — L02.416 ABSCESS OF LEFT THIGH: ICD-10-CM

## 2024-12-25 DIAGNOSIS — S71.102A OPEN WOUND OF LEFT THIGH, INITIAL ENCOUNTER: ICD-10-CM

## 2024-12-25 LAB
ANION GAP SERPL CALCULATED.3IONS-SCNC: 13 MMOL/L (ref 7–15)
BASOPHILS # BLD AUTO: 0.1 10E3/UL (ref 0–0.2)
BASOPHILS NFR BLD AUTO: 1 %
BUN SERPL-MCNC: 13.4 MG/DL (ref 6–20)
CALCIUM SERPL-MCNC: 8.9 MG/DL (ref 8.8–10.4)
CHLORIDE SERPL-SCNC: 105 MMOL/L (ref 98–107)
CREAT SERPL-MCNC: 1.16 MG/DL (ref 0.67–1.17)
EGFRCR SERPLBLD CKD-EPI 2021: 89 ML/MIN/1.73M2
EOSINOPHIL # BLD AUTO: 0.4 10E3/UL (ref 0–0.7)
EOSINOPHIL NFR BLD AUTO: 6 %
ERYTHROCYTE [DISTWIDTH] IN BLOOD BY AUTOMATED COUNT: 12.5 % (ref 10–15)
GLUCOSE SERPL-MCNC: 123 MG/DL (ref 70–99)
HCO3 SERPL-SCNC: 22 MMOL/L (ref 22–29)
HCT VFR BLD AUTO: 42.5 % (ref 40–53)
HGB BLD-MCNC: 14.2 G/DL (ref 13.3–17.7)
HOLD SPECIMEN: NORMAL
HOLD SPECIMEN: NORMAL
IMM GRANULOCYTES # BLD: 0.1 10E3/UL
IMM GRANULOCYTES NFR BLD: 1 %
LYMPHOCYTES # BLD AUTO: 2.4 10E3/UL (ref 0.8–5.3)
LYMPHOCYTES NFR BLD AUTO: 39 %
MCH RBC QN AUTO: 29.3 PG (ref 26.5–33)
MCHC RBC AUTO-ENTMCNC: 33.4 G/DL (ref 31.5–36.5)
MCV RBC AUTO: 88 FL (ref 78–100)
MONOCYTES # BLD AUTO: 0.7 10E3/UL (ref 0–1.3)
MONOCYTES NFR BLD AUTO: 12 %
NEUTROPHILS # BLD AUTO: 2.4 10E3/UL (ref 1.6–8.3)
NEUTROPHILS NFR BLD AUTO: 40 %
NRBC # BLD AUTO: 0 10E3/UL
NRBC BLD AUTO-RTO: 0 /100
PLATELET # BLD AUTO: 488 10E3/UL (ref 150–450)
POTASSIUM SERPL-SCNC: 4.3 MMOL/L (ref 3.4–5.3)
RBC # BLD AUTO: 4.84 10E6/UL (ref 4.4–5.9)
SODIUM SERPL-SCNC: 140 MMOL/L (ref 135–145)
WBC # BLD AUTO: 6 10E3/UL (ref 4–11)

## 2024-12-25 PROCEDURE — 36415 COLL VENOUS BLD VENIPUNCTURE: CPT | Performed by: EMERGENCY MEDICINE

## 2024-12-25 PROCEDURE — 250N000013 HC RX MED GY IP 250 OP 250 PS 637: Performed by: EMERGENCY MEDICINE

## 2024-12-25 PROCEDURE — 80048 BASIC METABOLIC PNL TOTAL CA: CPT | Performed by: EMERGENCY MEDICINE

## 2024-12-25 PROCEDURE — 76882 US LMTD JT/FCL EVL NVASC XTR: CPT | Mod: LT

## 2024-12-25 PROCEDURE — 99284 EMERGENCY DEPT VISIT MOD MDM: CPT | Mod: 25

## 2024-12-25 PROCEDURE — 85004 AUTOMATED DIFF WBC COUNT: CPT | Performed by: EMERGENCY MEDICINE

## 2024-12-25 RX ORDER — IBUPROFEN 600 MG/1
600 TABLET, FILM COATED ORAL ONCE
Status: COMPLETED | OUTPATIENT
Start: 2024-12-25 | End: 2024-12-25

## 2024-12-25 RX ORDER — CEPHALEXIN 500 MG/1
500 CAPSULE ORAL 3 TIMES DAILY
Qty: 30 CAPSULE | Refills: 0 | Status: SHIPPED | OUTPATIENT
Start: 2024-12-25 | End: 2025-01-04

## 2024-12-25 RX ORDER — DOXYCYCLINE 100 MG/1
100 CAPSULE ORAL 2 TIMES DAILY
Qty: 20 CAPSULE | Refills: 0 | Status: SHIPPED | OUTPATIENT
Start: 2024-12-25 | End: 2025-01-04

## 2024-12-25 RX ADMIN — IBUPROFEN 600 MG: 600 TABLET, FILM COATED ORAL at 18:53

## 2024-12-25 ASSESSMENT — ACTIVITIES OF DAILY LIVING (ADL)
ADLS_ACUITY_SCORE: 41
ADLS_ACUITY_SCORE: 41

## 2024-12-26 NOTE — ED NOTES
Pt forgot blanket in the room. Attempted to call family with no answer. [place] placed in a patient belongings bag with the patient sticker on it by the ER .

## 2024-12-26 NOTE — ED PROVIDER NOTES
"  Emergency Department Note      History of Present Illness     Chief Complaint   Leg Pain      HPI   Juan Carlos Edouard is a 24 year old male here for evaluation of leg pain. Patient was seen here on 12/16 for an abscess on his left thigh, performed incision and drainage, diagnosed with cellulitis and discharged with Bactrim antibiotics. He finished the Bactrim yesterday. Since then the wound has not healed. He states the wound is more red and opened up two days ago and some puss came out. He denies pain. Denies fevers.     Independent Historian   None    Review of External Notes   ***    Past Medical History     Medical History and Problem List   Psychosis     Medications   Zyprexa     Surgical History   Myringotomy   Tonsillectomy       Physical Exam     Patient Vitals for the past 24 hrs:   BP Temp Temp src Pulse Resp SpO2   12/25/24 1807 121/84 97.8  F (36.6  C) Temporal -- -- --   12/25/24 1803 -- -- -- 110 18 99 %     Physical Exam  ***    Diagnostics     Lab Results   Labs Ordered and Resulted from Time of ED Arrival to Time of ED Departure   CBC WITH PLATELETS AND DIFFERENTIAL - Abnormal       Result Value    WBC Count 6.0      RBC Count 4.84      Hemoglobin 14.2      Hematocrit 42.5      MCV 88      MCH 29.3      MCHC 33.4      RDW 12.5      Platelet Count 488 (*)     % Neutrophils 40      % Lymphocytes 39      % Monocytes 12      % Eosinophils 6      % Basophils 1      % Immature Granulocytes 1      NRBCs per 100 WBC 0      Absolute Neutrophils 2.4      Absolute Lymphocytes 2.4      Absolute Monocytes 0.7      Absolute Eosinophils 0.4      Absolute Basophils 0.1      Absolute Immature Granulocytes 0.1      Absolute NRBCs 0.0     BASIC METABOLIC PANEL       Imaging   POC US SOFT TISSUE    (Results Pending)         Independent Interpretation   {IndependentReview:034761::\"None\"}    ED Course      Medications Administered   Medications   ibuprofen (ADVIL/MOTRIN) tablet 600 mg (has no administration in time " "range)       Procedures   Procedures     Discussion of Management   {Consults/Care Discussions:397345::\"None\"}    ED Course   ED Course as of 12/25/24 2012   Wed Dec 25, 2024   1842 I obtained history and examined the patient as noted above.    2011 I rechecked and updated the patient.        Additional Documentation  None    Medical Decision Making / Diagnosis     CMS Diagnoses: None    MIPS       None    MDM   Juan Carlos Edouard is a 24 year old male ***    Disposition   The patient was discharged.     Diagnosis   No diagnosis found.     Discharge Medications   New Prescriptions    No medications on file         Scribe Disclosure:  I, Kellen Thornton, am serving as a scribe at 7:34 PM on 12/25/2024 to document services personally performed by Paloma Coornado, * based on my observations and the provider's statements to me.     " they can assess for that at that time.  Return for spreading redness, fevers, or black or purple discoloration of the wound.  Additional antibiotics prescribed.    Disposition   The patient was discharged.     Diagnosis     ICD-10-CM    1. Abscess of left thigh  L02.416 Adult Gen Surg  Referral      2. Open wound of left thigh, initial encounter  S71.102A Adult Gen Surg  Referral           Discharge Medications   Discharge Medication List as of 12/25/2024  8:33 PM        START taking these medications    Details   cephALEXin (KEFLEX) 500 MG capsule Take 1 capsule (500 mg) by mouth 3 times daily for 10 days., Disp-30 capsule, R-0, E-Prescribe      doxycycline hyclate (VIBRAMYCIN) 100 MG capsule Take 1 capsule (100 mg) by mouth 2 times daily for 10 days., Disp-20 capsule, R-0, E-Prescribe               Scribe Disclosure:  I, Kellen Thornton, am serving as a scribe at 7:34 PM on 12/25/2024 to document services personally performed by Paloma Coronado, * based on my observations and the provider's statements to me.        Paloma Coronado MD  12/27/24 8712

## 2024-12-26 NOTE — ED NOTES
Resp:  Non-labored  Neuro:  Alert and cooperative  MSkel:  Moving all extremities  Skin:  Open wound with skin break down that is shallow on the edges with a central deeper opening, no pus able to be expressed.  This breakdown is new vs prior visit picture.  Surrounding induration and erythema but smaller than previous picture.